# Patient Record
Sex: MALE | Race: WHITE | NOT HISPANIC OR LATINO | URBAN - METROPOLITAN AREA
[De-identification: names, ages, dates, MRNs, and addresses within clinical notes are randomized per-mention and may not be internally consistent; named-entity substitution may affect disease eponyms.]

---

## 2020-01-13 ENCOUNTER — OUTPATIENT (OUTPATIENT)
Dept: OUTPATIENT SERVICES | Facility: HOSPITAL | Age: 50
LOS: 1 days | End: 2020-01-13
Payer: COMMERCIAL

## 2020-01-13 VITALS
HEIGHT: 71.5 IN | HEART RATE: 66 BPM | OXYGEN SATURATION: 99 % | DIASTOLIC BLOOD PRESSURE: 90 MMHG | TEMPERATURE: 98 F | SYSTOLIC BLOOD PRESSURE: 132 MMHG | RESPIRATION RATE: 19 BRPM | WEIGHT: 242.95 LBS

## 2020-01-13 DIAGNOSIS — Z01.818 ENCOUNTER FOR OTHER PREPROCEDURAL EXAMINATION: ICD-10-CM

## 2020-01-13 DIAGNOSIS — Z12.11 ENCOUNTER FOR SCREENING FOR MALIGNANT NEOPLASM OF COLON: ICD-10-CM

## 2020-01-13 DIAGNOSIS — T14.8XXA OTHER INJURY OF UNSPECIFIED BODY REGION, INITIAL ENCOUNTER: Chronic | ICD-10-CM

## 2020-01-13 LAB
ANION GAP SERPL CALC-SCNC: 3 MMOL/L — LOW (ref 5–17)
BASOPHILS # BLD AUTO: 0.02 K/UL — SIGNIFICANT CHANGE UP (ref 0–0.2)
BASOPHILS NFR BLD AUTO: 0.3 % — SIGNIFICANT CHANGE UP (ref 0–2)
BUN SERPL-MCNC: 19 MG/DL — SIGNIFICANT CHANGE UP (ref 7–23)
CALCIUM SERPL-MCNC: 9.2 MG/DL — SIGNIFICANT CHANGE UP (ref 8.5–10.1)
CHLORIDE SERPL-SCNC: 110 MMOL/L — HIGH (ref 96–108)
CO2 SERPL-SCNC: 26 MMOL/L — SIGNIFICANT CHANGE UP (ref 22–31)
CREAT SERPL-MCNC: 1 MG/DL — SIGNIFICANT CHANGE UP (ref 0.5–1.3)
EOSINOPHIL # BLD AUTO: 0.11 K/UL — SIGNIFICANT CHANGE UP (ref 0–0.5)
EOSINOPHIL NFR BLD AUTO: 1.7 % — SIGNIFICANT CHANGE UP (ref 0–6)
GLUCOSE SERPL-MCNC: 187 MG/DL — HIGH (ref 70–99)
HCT VFR BLD CALC: 45.2 % — SIGNIFICANT CHANGE UP (ref 39–50)
HGB BLD-MCNC: 15.5 G/DL — SIGNIFICANT CHANGE UP (ref 13–17)
IMM GRANULOCYTES NFR BLD AUTO: 0.2 % — SIGNIFICANT CHANGE UP (ref 0–1.5)
LYMPHOCYTES # BLD AUTO: 2.29 K/UL — SIGNIFICANT CHANGE UP (ref 1–3.3)
LYMPHOCYTES # BLD AUTO: 35.9 % — SIGNIFICANT CHANGE UP (ref 13–44)
MCHC RBC-ENTMCNC: 30.4 PG — SIGNIFICANT CHANGE UP (ref 27–34)
MCHC RBC-ENTMCNC: 34.3 GM/DL — SIGNIFICANT CHANGE UP (ref 32–36)
MCV RBC AUTO: 88.6 FL — SIGNIFICANT CHANGE UP (ref 80–100)
MONOCYTES # BLD AUTO: 0.44 K/UL — SIGNIFICANT CHANGE UP (ref 0–0.9)
MONOCYTES NFR BLD AUTO: 6.9 % — SIGNIFICANT CHANGE UP (ref 2–14)
NEUTROPHILS # BLD AUTO: 3.5 K/UL — SIGNIFICANT CHANGE UP (ref 1.8–7.4)
NEUTROPHILS NFR BLD AUTO: 55 % — SIGNIFICANT CHANGE UP (ref 43–77)
PLATELET # BLD AUTO: 205 K/UL — SIGNIFICANT CHANGE UP (ref 150–400)
POTASSIUM SERPL-MCNC: 4.4 MMOL/L — SIGNIFICANT CHANGE UP (ref 3.5–5.3)
POTASSIUM SERPL-SCNC: 4.4 MMOL/L — SIGNIFICANT CHANGE UP (ref 3.5–5.3)
RBC # BLD: 5.1 M/UL — SIGNIFICANT CHANGE UP (ref 4.2–5.8)
RBC # FLD: 13.1 % — SIGNIFICANT CHANGE UP (ref 10.3–14.5)
SODIUM SERPL-SCNC: 139 MMOL/L — SIGNIFICANT CHANGE UP (ref 135–145)
WBC # BLD: 6.37 K/UL — SIGNIFICANT CHANGE UP (ref 3.8–10.5)
WBC # FLD AUTO: 6.37 K/UL — SIGNIFICANT CHANGE UP (ref 3.8–10.5)

## 2020-01-13 PROCEDURE — 93005 ELECTROCARDIOGRAM TRACING: CPT

## 2020-01-13 PROCEDURE — G0463: CPT | Mod: 25

## 2020-01-13 PROCEDURE — 80048 BASIC METABOLIC PNL TOTAL CA: CPT

## 2020-01-13 PROCEDURE — 85025 COMPLETE CBC W/AUTO DIFF WBC: CPT

## 2020-01-13 PROCEDURE — 36415 COLL VENOUS BLD VENIPUNCTURE: CPT

## 2020-01-13 PROCEDURE — 93010 ELECTROCARDIOGRAM REPORT: CPT

## 2020-01-13 NOTE — H&P PST ADULT - NSANTHOSAYNRD_GEN_A_CORE
No. LIVE screening performed.  STOP BANG Legend: 0-2 = LOW Risk; 3-4 = INTERMEDIATE Risk; 5-8 = HIGH Risk

## 2020-01-13 NOTE — H&P PST ADULT - NSICDXFAMILYHX_GEN_ALL_CORE_FT
FAMILY HISTORY:  Family history of bladder cancer, father - also CAD, pacemaker  Family history of uterine cancer, mother

## 2020-01-13 NOTE — H&P PST ADULT - ASSESSMENT
50 y/o male scheduled for routine screening colonoscopy.  Plan:  1. NPO post midnight  2. Follow bowel prep instructions from Dr. Buckley  3. Follow preop medication instructions from Dr. Buckley  4. Labs, EKG as per Dr. Buckley

## 2020-01-13 NOTE — H&P PST ADULT - TEACHING/LEARNING LEARNING PREFERENCES
individual instruction/pictorial/verbal instruction/written material/video/skill demonstration/group instruction

## 2020-01-13 NOTE — H&P PST ADULT - NSICDXPASTMEDICALHX_GEN_ALL_CORE_FT
PAST MEDICAL HISTORY:  DM (diabetes mellitus)     HTN (hypertension)     Hyperlipidemia     Obesity     Thyroid nodule

## 2020-01-14 DIAGNOSIS — Z01.818 ENCOUNTER FOR OTHER PREPROCEDURAL EXAMINATION: ICD-10-CM

## 2020-01-14 DIAGNOSIS — Z12.11 ENCOUNTER FOR SCREENING FOR MALIGNANT NEOPLASM OF COLON: ICD-10-CM

## 2020-01-21 ENCOUNTER — OUTPATIENT (OUTPATIENT)
Dept: OUTPATIENT SERVICES | Facility: HOSPITAL | Age: 50
LOS: 1 days | Discharge: HOME | End: 2020-01-21

## 2020-01-21 VITALS
WEIGHT: 235.01 LBS | TEMPERATURE: 97 F | OXYGEN SATURATION: 99 % | SYSTOLIC BLOOD PRESSURE: 134 MMHG | HEART RATE: 88 BPM | HEIGHT: 71.5 IN | DIASTOLIC BLOOD PRESSURE: 82 MMHG | RESPIRATION RATE: 21 BRPM

## 2020-01-21 DIAGNOSIS — Z12.11 ENCOUNTER FOR SCREENING FOR MALIGNANT NEOPLASM OF COLON: ICD-10-CM

## 2020-01-21 DIAGNOSIS — T14.8XXA OTHER INJURY OF UNSPECIFIED BODY REGION, INITIAL ENCOUNTER: Chronic | ICD-10-CM

## 2020-01-21 NOTE — ASU PATIENT PROFILE, ADULT - TEACHING/LEARNING LEARNING PREFERENCES
skill demonstration/group instruction/written material/video/pictorial/individual instruction/verbal instruction

## 2020-01-21 NOTE — ASU PREOP CHECKLIST - SITE MARKED BY ANESTHESIOLOGIST
Peer recovery support Xochitl Must in to speak with pt.       Benedicto Lucas RN  10/17/18 4749
Pt has already been assessed and needs in pt admission. Awaiting d/c's on 7s to proceed with admission. [facility] received a protection order, prohibiting pt to contact his wife and and child. [facility] police aware and faxed copy to [facility]. Pt is not permitted to use the phone at this time.             BECCA Moon  10/17/18 1232      PT WILL BE REFERRED OUT FOR IN  [place], [place]  10/17/18 7286
n/a

## 2020-01-23 DIAGNOSIS — Z79.4 LONG TERM (CURRENT) USE OF INSULIN: ICD-10-CM

## 2020-01-23 DIAGNOSIS — E11.9 TYPE 2 DIABETES MELLITUS WITHOUT COMPLICATIONS: ICD-10-CM

## 2020-01-23 DIAGNOSIS — E66.9 OBESITY, UNSPECIFIED: ICD-10-CM

## 2020-01-23 DIAGNOSIS — E78.5 HYPERLIPIDEMIA, UNSPECIFIED: ICD-10-CM

## 2020-01-23 DIAGNOSIS — Z12.11 ENCOUNTER FOR SCREENING FOR MALIGNANT NEOPLASM OF COLON: ICD-10-CM

## 2020-01-23 DIAGNOSIS — I10 ESSENTIAL (PRIMARY) HYPERTENSION: ICD-10-CM

## 2020-06-02 PROBLEM — Z00.00 ENCOUNTER FOR PREVENTIVE HEALTH EXAMINATION: Status: ACTIVE | Noted: 2020-06-02

## 2020-06-05 PROBLEM — E78.5 HYPERLIPIDEMIA, UNSPECIFIED: Chronic | Status: ACTIVE | Noted: 2020-01-13

## 2020-06-05 PROBLEM — E11.9 TYPE 2 DIABETES MELLITUS WITHOUT COMPLICATIONS: Chronic | Status: ACTIVE | Noted: 2020-01-13

## 2020-06-05 PROBLEM — E66.9 OBESITY, UNSPECIFIED: Chronic | Status: ACTIVE | Noted: 2020-01-13

## 2020-06-05 PROBLEM — E04.1 NONTOXIC SINGLE THYROID NODULE: Chronic | Status: ACTIVE | Noted: 2020-01-13

## 2020-06-05 PROBLEM — I10 ESSENTIAL (PRIMARY) HYPERTENSION: Chronic | Status: ACTIVE | Noted: 2020-01-13

## 2020-06-22 ENCOUNTER — APPOINTMENT (OUTPATIENT)
Dept: OTOLARYNGOLOGY | Facility: CLINIC | Age: 50
End: 2020-06-22
Payer: COMMERCIAL

## 2020-06-22 VITALS
TEMPERATURE: 97.2 F | DIASTOLIC BLOOD PRESSURE: 70 MMHG | BODY MASS INDEX: 32.2 KG/M2 | SYSTOLIC BLOOD PRESSURE: 130 MMHG | HEIGHT: 71 IN | WEIGHT: 230 LBS

## 2020-06-22 DIAGNOSIS — E04.2 NONTOXIC MULTINODULAR GOITER: ICD-10-CM

## 2020-06-22 DIAGNOSIS — R59.0 LOCALIZED ENLARGED LYMPH NODES: ICD-10-CM

## 2020-06-22 DIAGNOSIS — E11.9 TYPE 2 DIABETES MELLITUS W/OUT COMPLICATIONS: ICD-10-CM

## 2020-06-22 DIAGNOSIS — Z82.49 FAMILY HISTORY OF ISCHEMIC HEART DISEASE AND OTHER DISEASES OF THE CIRCULATORY SYSTEM: ICD-10-CM

## 2020-06-22 DIAGNOSIS — Z80.9 FAMILY HISTORY OF MALIGNANT NEOPLASM, UNSPECIFIED: ICD-10-CM

## 2020-06-22 DIAGNOSIS — Z78.9 OTHER SPECIFIED HEALTH STATUS: ICD-10-CM

## 2020-06-22 DIAGNOSIS — Z82.2 FAMILY HISTORY OF DEAFNESS AND HEARING LOSS: ICD-10-CM

## 2020-06-22 DIAGNOSIS — Z80.52 FAMILY HISTORY OF MALIGNANT NEOPLASM OF BLADDER: ICD-10-CM

## 2020-06-22 PROCEDURE — 99204 OFFICE O/P NEW MOD 45 MIN: CPT

## 2020-06-22 RX ORDER — GLIMEPIRIDE 4 MG/1
TABLET ORAL
Refills: 0 | Status: ACTIVE | COMMUNITY

## 2020-06-22 RX ORDER — SIMVASTATIN 40 MG/1
40 TABLET, FILM COATED ORAL
Refills: 0 | Status: ACTIVE | COMMUNITY

## 2020-06-22 RX ORDER — EMPAGLIFLOZIN 10 MG/1
10 TABLET, FILM COATED ORAL
Refills: 0 | Status: ACTIVE | COMMUNITY

## 2020-06-22 RX ORDER — PIOGLITAZONE HYDROCHLORIDE 15 MG/1
15 TABLET ORAL
Refills: 0 | Status: ACTIVE | COMMUNITY

## 2020-06-22 RX ORDER — METFORMIN HYDROCHLORIDE 625 MG/1
TABLET ORAL
Refills: 0 | Status: ACTIVE | COMMUNITY

## 2020-06-22 NOTE — CONSULT LETTER
[Dear  ___] : Dear  [unfilled], [Consult Letter:] : I had the pleasure of evaluating your patient, [unfilled]. [Please see my note below.] : Please see my note below. [Consult Closing:] : Thank you very much for allowing me to participate in the care of this patient.  If you have any questions, please do not hesitate to contact me. [Sincerely,] : Sincerely, [FreeTextEntry2] : Alexx Suazo MD [FreeTextEntry3] : Alexx Simons MD, FACS\par Chief of Otolaryngology [facility]\par  - Dept. of Otolaryngology\par [facility] [employer]\par \par

## 2020-06-22 NOTE — PHYSICAL EXAM
[de-identified] : Palpable L thyroid nodule.  Neck node not palpable.   [Midline] : trachea located in midline position [Normal] : no rashes

## 2020-06-22 NOTE — HISTORY OF PRESENT ILLNESS
[de-identified] : 50 y/o M, referred by Dr. Suazo, with a h/o thyroid nodules and a newly identified  hypervascular neck node.  He is asymptomatic.

## 2020-08-24 ENCOUNTER — APPOINTMENT (OUTPATIENT)
Dept: ULTRASOUND IMAGING | Facility: CLINIC | Age: 50
End: 2020-08-24
Payer: COMMERCIAL

## 2020-08-24 ENCOUNTER — OUTPATIENT (OUTPATIENT)
Dept: OUTPATIENT SERVICES | Facility: HOSPITAL | Age: 50
LOS: 1 days | End: 2020-08-24
Payer: COMMERCIAL

## 2020-08-24 ENCOUNTER — RESULT REVIEW (OUTPATIENT)
Age: 50
End: 2020-08-24

## 2020-08-24 DIAGNOSIS — R59.0 LOCALIZED ENLARGED LYMPH NODES: ICD-10-CM

## 2020-08-24 DIAGNOSIS — T14.8XXA OTHER INJURY OF UNSPECIFIED BODY REGION, INITIAL ENCOUNTER: Chronic | ICD-10-CM

## 2020-08-24 PROCEDURE — 88305 TISSUE EXAM BY PATHOLOGIST: CPT

## 2020-08-24 PROCEDURE — 88173 CYTOPATH EVAL FNA REPORT: CPT | Mod: 26

## 2020-08-24 PROCEDURE — 88305 TISSUE EXAM BY PATHOLOGIST: CPT | Mod: 26

## 2020-08-24 PROCEDURE — 10005 FNA BX W/US GDN 1ST LES: CPT

## 2020-08-24 PROCEDURE — 88173 CYTOPATH EVAL FNA REPORT: CPT

## 2020-08-26 LAB — NON-GYNECOLOGICAL CYTOLOGY STUDY: SIGNIFICANT CHANGE UP

## 2020-09-25 ENCOUNTER — APPOINTMENT (OUTPATIENT)
Dept: MRI IMAGING | Facility: CLINIC | Age: 50
End: 2020-09-25
Payer: SELF-PAY

## 2020-09-25 ENCOUNTER — OUTPATIENT (OUTPATIENT)
Dept: OUTPATIENT SERVICES | Facility: HOSPITAL | Age: 50
LOS: 1 days | End: 2020-09-25
Payer: SELF-PAY

## 2020-09-25 DIAGNOSIS — C73 MALIGNANT NEOPLASM OF THYROID GLAND: ICD-10-CM

## 2020-09-25 DIAGNOSIS — T14.8XXA OTHER INJURY OF UNSPECIFIED BODY REGION, INITIAL ENCOUNTER: Chronic | ICD-10-CM

## 2020-09-25 DIAGNOSIS — R59.0 LOCALIZED ENLARGED LYMPH NODES: ICD-10-CM

## 2020-09-25 PROCEDURE — 70543 MRI ORBT/FAC/NCK W/O &W/DYE: CPT | Mod: 26

## 2020-09-25 PROCEDURE — 70543 MRI ORBT/FAC/NCK W/O &W/DYE: CPT

## 2020-10-23 ENCOUNTER — NON-APPOINTMENT (OUTPATIENT)
Age: 50
End: 2020-10-23

## 2021-01-19 ENCOUNTER — APPOINTMENT (OUTPATIENT)
Dept: OTOLARYNGOLOGY | Facility: CLINIC | Age: 51
End: 2021-01-19

## 2021-03-23 ENCOUNTER — APPOINTMENT (OUTPATIENT)
Dept: OTOLARYNGOLOGY | Facility: CLINIC | Age: 51
End: 2021-03-23

## 2022-04-26 ENCOUNTER — APPOINTMENT (OUTPATIENT)
Dept: OTOLARYNGOLOGY | Facility: CLINIC | Age: 52
End: 2022-04-26
Payer: COMMERCIAL

## 2022-04-26 VITALS
HEART RATE: 94 BPM | DIASTOLIC BLOOD PRESSURE: 83 MMHG | HEIGHT: 72 IN | BODY MASS INDEX: 27.77 KG/M2 | WEIGHT: 205 LBS | OXYGEN SATURATION: 96 % | SYSTOLIC BLOOD PRESSURE: 136 MMHG

## 2022-04-26 DIAGNOSIS — C77.0 SECONDARY AND UNSPECIFIED MALIGNANT NEOPLASM OF LYMPH NODES OF HEAD, FACE AND NECK: ICD-10-CM

## 2022-04-26 PROCEDURE — 99244 OFF/OP CNSLTJ NEW/EST MOD 40: CPT

## 2022-04-26 NOTE — CONSULT LETTER
[Dear  ___] : Dear  [unfilled], [Consult Letter:] : I had the pleasure of evaluating your patient, [unfilled]. [Please see my note below.] : Please see my note below. [Consult Closing:] : Thank you very much for allowing me to participate in the care of this patient.  If you have any questions, please do not hesitate to contact me. [Sincerely,] : Sincerely, [FreeTextEntry2] : Alexx Suazo MD ([place], [place]) [FreeTextEntry3] : Joe Billy MD, FACS\par \par    [facility] [place]\par Associate Chair\par    Department of Otolaryngology\par \par Professor\par Otolaryngology & Molecular Medicine\par [facility] [employer]\par

## 2022-04-26 NOTE — HISTORY OF PRESENT ILLNESS
[None] : No associated symptoms are reported. [de-identified] : Mr. Vigil is a 50 yo male who presents for evaluation of  suspicious thyroid gland nodule and lymph node. He is being referred by Dr. Suazo. Lymph node previously biopsied and he verbalizes not having surgery due to insurance was not in effect. He recently saw [place] [facility]  Dr. Bhatti who ordered a PET scan. \par Around Fall of 2021,he also reports having a thyroid nodule FNA at Dr. Suazo's office and was told to be benign-L side According to Dr. Suazo's notes on 4/2021 FNA on  left thyroid lobe  nodule with benign findings. 3/24/2022 sonogram left lobe thyroid 3.5 cm nodule previously measured 2.9 cm.\par 8/24/2020 FNA right level III cervical lymph node showing PTCA at NW\par 3/10/2022 TFTs wnr.  On Synthroid  Thyroglobulin >300\par 1/10/2022 PET scan showing FDG avid at the left thyroid lobe with large hypodense area. \par 10/2/2020 MRI 2.7 cm bi-lobulated right level III lymph node. Numerous other cervical lymph nodes identified, largest in right level II measuring up to 1.3cm. Dominant left thyroid nodule measuring up to 2.1 cm. Possible additional right thyroid nodule measuring up to 0.7 cm\par Denies pain, dysphagia, dysphonia and or dyspnea \par Some weight loss recently, but he has had different lifestyle with working at night. \par Denies history oif smoking. Rare alcohol intake.\par No cardiac or pulmonary issues. \par Received COvid 19 vacc booster\par \par no swaloow or resp issues. \par \par The nodes in R neck were picked up incidentally on eval in office w Dr Suazo who pt sees for DM.

## 2022-05-12 ENCOUNTER — RESULT REVIEW (OUTPATIENT)
Age: 52
End: 2022-05-12

## 2022-05-18 DIAGNOSIS — Z01.818 ENCOUNTER FOR OTHER PREPROCEDURAL EXAMINATION: ICD-10-CM

## 2022-05-24 ENCOUNTER — OUTPATIENT (OUTPATIENT)
Dept: OUTPATIENT SERVICES | Facility: HOSPITAL | Age: 52
LOS: 1 days | End: 2022-05-24
Payer: COMMERCIAL

## 2022-05-24 ENCOUNTER — APPOINTMENT (OUTPATIENT)
Dept: ULTRASOUND IMAGING | Facility: CLINIC | Age: 52
End: 2022-05-24
Payer: COMMERCIAL

## 2022-05-24 ENCOUNTER — APPOINTMENT (OUTPATIENT)
Dept: MRI IMAGING | Facility: CLINIC | Age: 52
End: 2022-05-24
Payer: COMMERCIAL

## 2022-05-24 DIAGNOSIS — T14.8XXA OTHER INJURY OF UNSPECIFIED BODY REGION, INITIAL ENCOUNTER: Chronic | ICD-10-CM

## 2022-05-24 DIAGNOSIS — C77.0 SECONDARY AND UNSPECIFIED MALIGNANT NEOPLASM OF LYMPH NODES OF HEAD, FACE AND NECK: ICD-10-CM

## 2022-05-24 PROCEDURE — 70543 MRI ORBT/FAC/NCK W/O &W/DYE: CPT

## 2022-05-24 PROCEDURE — 70543 MRI ORBT/FAC/NCK W/O &W/DYE: CPT | Mod: 26

## 2022-05-24 PROCEDURE — 76536 US EXAM OF HEAD AND NECK: CPT | Mod: 26

## 2022-05-24 PROCEDURE — 76536 US EXAM OF HEAD AND NECK: CPT

## 2022-05-24 PROCEDURE — A9585: CPT

## 2022-06-13 NOTE — H&P PST ADULT - ADMIT DATE
13-Jan-2020 Please notify patient, EGD bx neg for HP. Would repeat EGD in 2 years for gastric mapping as some abnormal, but benign cells were seen. Colon polyps were benign, repeat in 2 years. Please go over prep instructions closely with patient - ate solid food the entire day prior, please remind him to go on CLD entire day before.

## 2022-06-14 ENCOUNTER — APPOINTMENT (OUTPATIENT)
Dept: OTOLARYNGOLOGY | Facility: CLINIC | Age: 52
End: 2022-06-14
Payer: COMMERCIAL

## 2022-06-14 VITALS
DIASTOLIC BLOOD PRESSURE: 87 MMHG | HEART RATE: 90 BPM | HEIGHT: 72 IN | WEIGHT: 205 LBS | BODY MASS INDEX: 27.77 KG/M2 | OXYGEN SATURATION: 97 % | SYSTOLIC BLOOD PRESSURE: 139 MMHG

## 2022-06-14 PROCEDURE — 99214 OFFICE O/P EST MOD 30 MIN: CPT

## 2022-06-14 NOTE — CONSULT LETTER
[Dear  ___] : Dear  [unfilled], [Courtesy Letter:] : I had the pleasure of seeing your patient, [unfilled], in my office today. [Please see my note below.] : Please see my note below. [Sincerely,] : Sincerely, [FreeTextEntry2] : Alexx Suazo MD ([place], [place]) \par  [FreeTextEntry3] : Joe Billy MD, FACS\par \par    [facility] [place]\par Associate Chair\par    Department of Otolaryngology\par \par Professor\par Otolaryngology & Molecular Medicine\par [facility] [employer]\par

## 2022-06-14 NOTE — HISTORY OF PRESENT ILLNESS
[de-identified] : Mr. Vigil presents for surgical discussion on PTCA. \par 5/24/2022 MRI done and sonogram again noted  abnormal appearing right cervical level 3 lymph nodes measuring 1.5 \par 3/10/2022 TFTs wnr, on Synthroid ?

## 2022-08-08 ENCOUNTER — OUTPATIENT (OUTPATIENT)
Dept: OUTPATIENT SERVICES | Facility: HOSPITAL | Age: 52
LOS: 1 days | End: 2022-08-08
Payer: COMMERCIAL

## 2022-08-08 VITALS
OXYGEN SATURATION: 97 % | SYSTOLIC BLOOD PRESSURE: 135 MMHG | HEART RATE: 76 BPM | HEIGHT: 70 IN | DIASTOLIC BLOOD PRESSURE: 77 MMHG | TEMPERATURE: 97 F | WEIGHT: 218.26 LBS | RESPIRATION RATE: 16 BRPM

## 2022-08-08 DIAGNOSIS — E78.5 HYPERLIPIDEMIA, UNSPECIFIED: ICD-10-CM

## 2022-08-08 DIAGNOSIS — E11.9 TYPE 2 DIABETES MELLITUS WITHOUT COMPLICATIONS: ICD-10-CM

## 2022-08-08 DIAGNOSIS — Z01.818 ENCOUNTER FOR OTHER PREPROCEDURAL EXAMINATION: ICD-10-CM

## 2022-08-08 DIAGNOSIS — I10 ESSENTIAL (PRIMARY) HYPERTENSION: ICD-10-CM

## 2022-08-08 DIAGNOSIS — T14.8XXA OTHER INJURY OF UNSPECIFIED BODY REGION, INITIAL ENCOUNTER: Chronic | ICD-10-CM

## 2022-08-08 DIAGNOSIS — C77.9 SECONDARY AND UNSPECIFIED MALIGNANT NEOPLASM OF LYMPH NODE, UNSPECIFIED: ICD-10-CM

## 2022-08-08 DIAGNOSIS — C77.0 SECONDARY AND UNSPECIFIED MALIGNANT NEOPLASM OF LYMPH NODES OF HEAD, FACE AND NECK: ICD-10-CM

## 2022-08-08 DIAGNOSIS — E03.9 HYPOTHYROIDISM, UNSPECIFIED: ICD-10-CM

## 2022-08-08 LAB
ANION GAP SERPL CALC-SCNC: 9 MMOL/L — SIGNIFICANT CHANGE UP (ref 5–17)
BLD GP AB SCN SERPL QL: SIGNIFICANT CHANGE UP
BUN SERPL-MCNC: 28 MG/DL — HIGH (ref 7–23)
CALCIUM SERPL-MCNC: 9.1 MG/DL — SIGNIFICANT CHANGE UP (ref 8.4–10.5)
CHLORIDE SERPL-SCNC: 105 MMOL/L — SIGNIFICANT CHANGE UP (ref 96–108)
CO2 SERPL-SCNC: 22 MMOL/L — SIGNIFICANT CHANGE UP (ref 22–31)
CREAT SERPL-MCNC: 0.89 MG/DL — SIGNIFICANT CHANGE UP (ref 0.5–1.3)
EGFR: 102 ML/MIN/1.73M2 — SIGNIFICANT CHANGE UP
GLUCOSE SERPL-MCNC: 134 MG/DL — HIGH (ref 70–99)
HCT VFR BLD CALC: 46.1 % — SIGNIFICANT CHANGE UP (ref 39–50)
HGB BLD-MCNC: 15.9 G/DL — SIGNIFICANT CHANGE UP (ref 13–17)
MCHC RBC-ENTMCNC: 29.9 PG — SIGNIFICANT CHANGE UP (ref 27–34)
MCHC RBC-ENTMCNC: 34.5 GM/DL — SIGNIFICANT CHANGE UP (ref 32–36)
MCV RBC AUTO: 86.8 FL — SIGNIFICANT CHANGE UP (ref 80–100)
NRBC # BLD: 0 /100 WBCS — SIGNIFICANT CHANGE UP (ref 0–0)
PLATELET # BLD AUTO: 216 K/UL — SIGNIFICANT CHANGE UP (ref 150–400)
POTASSIUM SERPL-MCNC: 3.9 MMOL/L — SIGNIFICANT CHANGE UP (ref 3.5–5.3)
POTASSIUM SERPL-SCNC: 3.9 MMOL/L — SIGNIFICANT CHANGE UP (ref 3.5–5.3)
RBC # BLD: 5.31 M/UL — SIGNIFICANT CHANGE UP (ref 4.2–5.8)
RBC # FLD: 12.9 % — SIGNIFICANT CHANGE UP (ref 10.3–14.5)
SODIUM SERPL-SCNC: 136 MMOL/L — SIGNIFICANT CHANGE UP (ref 135–145)
WBC # BLD: 7.37 K/UL — SIGNIFICANT CHANGE UP (ref 3.8–10.5)
WBC # FLD AUTO: 7.37 K/UL — SIGNIFICANT CHANGE UP (ref 3.8–10.5)

## 2022-08-08 PROCEDURE — 86901 BLOOD TYPING SEROLOGIC RH(D): CPT

## 2022-08-08 PROCEDURE — 86900 BLOOD TYPING SEROLOGIC ABO: CPT

## 2022-08-08 PROCEDURE — G0463: CPT

## 2022-08-08 PROCEDURE — 93010 ELECTROCARDIOGRAM REPORT: CPT | Mod: NC

## 2022-08-08 PROCEDURE — 36415 COLL VENOUS BLD VENIPUNCTURE: CPT

## 2022-08-08 PROCEDURE — 86850 RBC ANTIBODY SCREEN: CPT

## 2022-08-08 PROCEDURE — 85027 COMPLETE CBC AUTOMATED: CPT

## 2022-08-08 PROCEDURE — 93005 ELECTROCARDIOGRAM TRACING: CPT

## 2022-08-08 PROCEDURE — 80048 BASIC METABOLIC PNL TOTAL CA: CPT

## 2022-08-08 RX ORDER — PIOGLITAZONE HYDROCHLORIDE 15 MG/1
1 TABLET ORAL
Qty: 0 | Refills: 0 | DISCHARGE

## 2022-08-08 RX ORDER — DULAGLUTIDE 4.5 MG/.5ML
0 INJECTION, SOLUTION SUBCUTANEOUS
Qty: 0 | Refills: 0 | DISCHARGE

## 2022-08-08 RX ORDER — SIMVASTATIN 20 MG/1
1 TABLET, FILM COATED ORAL
Qty: 0 | Refills: 0 | DISCHARGE

## 2022-08-08 NOTE — H&P PST ADULT - HISTORY OF PRESENT ILLNESS
53yo male with medical h/o T2DM, HTN, Hypothyroid, and HDL, reports Thyroid cancer and presents today for PST for scheduled Left Thyroidectomy w/Limited Neck Dissection, Right Modified Radical Neck Dissection on 8/22/22

## 2022-08-08 NOTE — H&P PST ADULT - PROBLEM SELECTOR PLAN 1
Pre-op instructions given. Pt verbalized understanding  Chlorhexidine wash instructions given  ABO + Accu-Chek ordered STAT for day of procedure  Pt to HOLD all diabetes oral meds day of procedure - hold Jardiance 3 days before surgery   Pt instructed to HOLD all NSAID, Herbal tea/supplements  Pending: M/C    Pending: Covid pcr test/results

## 2022-08-08 NOTE — H&P PST ADULT - MUSCULOSKELETAL
negative ROM intact/normal gait/strength 5/5 bilateral upper extremities/strength 5/5 bilateral lower extremities

## 2022-08-08 NOTE — H&P PST ADULT - FALL HARM RISK - UNIVERSAL INTERVENTIONS
Bed in lowest position, wheels locked, appropriate side rails in place/Call bell, personal items and telephone in reach/Instruct patient to call for assistance before getting out of bed or chair/Non-slip footwear when patient is out of bed/[place] to call system/Physically safe environment - no spills, clutter or unnecessary equipment/Purposeful Proactive Rounding/Room/bathroom lighting operational, light cord in reach

## 2022-08-08 NOTE — H&P PST ADULT - BRAND OF COVID-19 VACCINATION
Spoke with mom about the results. Will order an upper GI. Please call mom to schedule test.    Moderna dose 1 and 2

## 2022-08-08 NOTE — H&P PST ADULT - PROBLEM SELECTOR PLAN 4
Pt instructed to take meds as prescribed  Accu-Chek ordered STAT for day of procedure  Pt to HOLD all diabetes oral meds day of procedure - hold Jardiance 3 days before surgery

## 2022-08-08 NOTE — H&P PST ADULT - NSANTHOSAYNRD_GEN_A_CORE
neck 17inches/No. LIVE screening performed.  STOP BANG Legend: 0-2 = LOW Risk; 3-4 = INTERMEDIATE Risk; 5-8 = HIGH Risk

## 2022-08-19 LAB — SURGICAL PATHOLOGY STUDY: SIGNIFICANT CHANGE UP

## 2022-08-21 ENCOUNTER — TRANSCRIPTION ENCOUNTER (OUTPATIENT)
Age: 52
End: 2022-08-21

## 2022-08-22 ENCOUNTER — INPATIENT (INPATIENT)
Facility: HOSPITAL | Age: 52
LOS: 0 days | Discharge: ANOTHER HOSPITAL | DRG: 626 | End: 2022-08-22
Attending: OTOLARYNGOLOGY | Admitting: OTOLARYNGOLOGY
Payer: COMMERCIAL

## 2022-08-22 ENCOUNTER — RESULT REVIEW (OUTPATIENT)
Age: 52
End: 2022-08-22

## 2022-08-22 ENCOUNTER — TRANSCRIPTION ENCOUNTER (OUTPATIENT)
Age: 52
End: 2022-08-22

## 2022-08-22 ENCOUNTER — APPOINTMENT (OUTPATIENT)
Dept: OTOLARYNGOLOGY | Facility: HOSPITAL | Age: 52
End: 2022-08-22

## 2022-08-22 ENCOUNTER — INPATIENT (INPATIENT)
Facility: HOSPITAL | Age: 52
LOS: 4 days | Discharge: HOME | End: 2022-08-27
Attending: OTOLARYNGOLOGY | Admitting: OTOLARYNGOLOGY

## 2022-08-22 VITALS
OXYGEN SATURATION: 98 % | HEIGHT: 70 IN | WEIGHT: 218.26 LBS | SYSTOLIC BLOOD PRESSURE: 128 MMHG | DIASTOLIC BLOOD PRESSURE: 78 MMHG | RESPIRATION RATE: 14 BRPM | TEMPERATURE: 98 F | HEART RATE: 75 BPM

## 2022-08-22 VITALS
TEMPERATURE: 98 F | RESPIRATION RATE: 17 BRPM | SYSTOLIC BLOOD PRESSURE: 117 MMHG | OXYGEN SATURATION: 99 % | HEART RATE: 87 BPM | DIASTOLIC BLOOD PRESSURE: 76 MMHG

## 2022-08-22 VITALS — HEIGHT: 70 IN | WEIGHT: 218.26 LBS

## 2022-08-22 DIAGNOSIS — T14.8XXA OTHER INJURY OF UNSPECIFIED BODY REGION, INITIAL ENCOUNTER: Chronic | ICD-10-CM

## 2022-08-22 DIAGNOSIS — C77.0 SECONDARY AND UNSPECIFIED MALIGNANT NEOPLASM OF LYMPH NODES OF HEAD, FACE AND NECK: ICD-10-CM

## 2022-08-22 DIAGNOSIS — C80.1 MALIGNANT (PRIMARY) NEOPLASM, UNSPECIFIED: ICD-10-CM

## 2022-08-22 LAB
A1C WITH ESTIMATED AVERAGE GLUCOSE RESULT: 7.6 % — HIGH (ref 4–5.6)
ABO RH CONFIRMATION: SIGNIFICANT CHANGE UP
ANION GAP SERPL CALC-SCNC: 12 MMOL/L — SIGNIFICANT CHANGE UP (ref 7–14)
APTT BLD: 26.3 SEC — LOW (ref 27–36.3)
BASE EXCESS BLDA CALC-SCNC: -4.8 MMOL/L — LOW (ref -2–3)
BLD GP AB SCN SERPL QL: NEGATIVE — SIGNIFICANT CHANGE UP
BLOOD GAS ARTERIAL - LYTES,HGB,ICA,LACT RESULT: SIGNIFICANT CHANGE UP
BUN SERPL-MCNC: 22 MG/DL — SIGNIFICANT CHANGE UP (ref 7–23)
CALCIUM SERPL-MCNC: 8.3 MG/DL — LOW (ref 8.4–10.5)
CHLORIDE SERPL-SCNC: 105 MMOL/L — SIGNIFICANT CHANGE UP (ref 98–107)
CO2 BLDA-SCNC: 22 MMOL/L — SIGNIFICANT CHANGE UP (ref 19–24)
CO2 SERPL-SCNC: 18 MMOL/L — LOW (ref 22–31)
CREAT SERPL-MCNC: 0.99 MG/DL — SIGNIFICANT CHANGE UP (ref 0.5–1.3)
EGFR: 91 ML/MIN/1.73M2 — SIGNIFICANT CHANGE UP
ESTIMATED AVERAGE GLUCOSE: 171 — SIGNIFICANT CHANGE UP
GAS PNL BLDA: SIGNIFICANT CHANGE UP
GLUCOSE BLDC GLUCOMTR-MCNC: 183 MG/DL — HIGH (ref 70–99)
GLUCOSE BLDC GLUCOMTR-MCNC: 209 MG/DL — HIGH (ref 70–99)
GLUCOSE BLDC GLUCOMTR-MCNC: 273 MG/DL — HIGH (ref 70–99)
GLUCOSE SERPL-MCNC: 258 MG/DL — HIGH (ref 70–99)
HCO3 BLDA-SCNC: 21 MMOL/L — SIGNIFICANT CHANGE UP (ref 21–28)
HCT VFR BLD CALC: 40.7 % — SIGNIFICANT CHANGE UP (ref 39–50)
HGB BLD-MCNC: 14.1 G/DL — SIGNIFICANT CHANGE UP (ref 13–17)
HOROWITZ INDEX BLDA+IHG-RTO: 60 — SIGNIFICANT CHANGE UP
INR BLD: 1.04 RATIO — SIGNIFICANT CHANGE UP (ref 0.88–1.16)
MAGNESIUM SERPL-MCNC: 1.7 MG/DL — SIGNIFICANT CHANGE UP (ref 1.6–2.6)
MCHC RBC-ENTMCNC: 30.8 PG — SIGNIFICANT CHANGE UP (ref 27–34)
MCHC RBC-ENTMCNC: 34.6 GM/DL — SIGNIFICANT CHANGE UP (ref 32–36)
MCV RBC AUTO: 88.9 FL — SIGNIFICANT CHANGE UP (ref 80–100)
NRBC # BLD: 0 /100 WBCS — SIGNIFICANT CHANGE UP (ref 0–0)
NRBC # FLD: 0 K/UL — SIGNIFICANT CHANGE UP (ref 0–0)
PCO2 BLDA: 39 MMHG — SIGNIFICANT CHANGE UP (ref 35–48)
PH BLDA: 7.34 — LOW (ref 7.35–7.45)
PHOSPHATE SERPL-MCNC: 4.1 MG/DL — SIGNIFICANT CHANGE UP (ref 2.5–4.5)
PLATELET # BLD AUTO: 209 K/UL — SIGNIFICANT CHANGE UP (ref 150–400)
PO2 BLDA: 92 MMHG — SIGNIFICANT CHANGE UP (ref 83–108)
POTASSIUM SERPL-MCNC: 4.2 MMOL/L — SIGNIFICANT CHANGE UP (ref 3.5–5.3)
POTASSIUM SERPL-SCNC: 4.2 MMOL/L — SIGNIFICANT CHANGE UP (ref 3.5–5.3)
PROTHROM AB SERPL-ACNC: 12.1 SEC — SIGNIFICANT CHANGE UP (ref 10.5–13.4)
PTH INTACT, INTRAOP SPECIMEN 2: SIGNIFICANT CHANGE UP
PTH INTACT, INTRAOP TIMING 2: SIGNIFICANT CHANGE UP
PTH INTACT, INTRAOPERATIVE 2: 28 PG/ML — SIGNIFICANT CHANGE UP (ref 15–65)
PTH-INTACT IO % DIF SERPL: 44 PG/ML — SIGNIFICANT CHANGE UP (ref 15–65)
RBC # BLD: 4.58 M/UL — SIGNIFICANT CHANGE UP (ref 4.2–5.8)
RBC # FLD: 13.3 % — SIGNIFICANT CHANGE UP (ref 10.3–14.5)
RH IG SCN BLD-IMP: POSITIVE — SIGNIFICANT CHANGE UP
SAO2 % BLDA: 98.2 % — HIGH (ref 94–98)
SARS-COV-2 RNA SPEC QL NAA+PROBE: SIGNIFICANT CHANGE UP
SODIUM SERPL-SCNC: 135 MMOL/L — SIGNIFICANT CHANGE UP (ref 135–145)
WBC # BLD: 13.61 K/UL — HIGH (ref 3.8–10.5)
WBC # FLD AUTO: 13.61 K/UL — HIGH (ref 3.8–10.5)

## 2022-08-22 PROCEDURE — 94002 VENT MGMT INPAT INIT DAY: CPT

## 2022-08-22 PROCEDURE — 60254 EXTENSIVE THYROID SURGERY: CPT | Mod: AS

## 2022-08-22 PROCEDURE — 99254 IP/OBS CNSLTJ NEW/EST MOD 60: CPT | Mod: 25

## 2022-08-22 PROCEDURE — 36415 COLL VENOUS BLD VENIPUNCTURE: CPT

## 2022-08-22 PROCEDURE — 71045 X-RAY EXAM CHEST 1 VIEW: CPT | Mod: 26,77

## 2022-08-22 PROCEDURE — 36600 WITHDRAWAL OF ARTERIAL BLOOD: CPT

## 2022-08-22 PROCEDURE — 38724 REMOVAL OF LYMPH NODES NECK: CPT | Mod: RT

## 2022-08-22 PROCEDURE — 71045 X-RAY EXAM CHEST 1 VIEW: CPT

## 2022-08-22 PROCEDURE — 82962 GLUCOSE BLOOD TEST: CPT

## 2022-08-22 PROCEDURE — 82803 BLOOD GASES ANY COMBINATION: CPT

## 2022-08-22 PROCEDURE — C1889: CPT

## 2022-08-22 PROCEDURE — 87635 SARS-COV-2 COVID-19 AMP PRB: CPT

## 2022-08-22 PROCEDURE — 83970 ASSAY OF PARATHORMONE: CPT

## 2022-08-22 PROCEDURE — 60252 REMOVAL OF THYROID: CPT | Mod: LT

## 2022-08-22 PROCEDURE — 71045 X-RAY EXAM CHEST 1 VIEW: CPT | Mod: 26

## 2022-08-22 PROCEDURE — L8699: CPT

## 2022-08-22 PROCEDURE — 88307 TISSUE EXAM BY PATHOLOGIST: CPT

## 2022-08-22 PROCEDURE — 88307 TISSUE EXAM BY PATHOLOGIST: CPT | Mod: 26

## 2022-08-22 DEVICE — CLIP 24 SMALL TITAN: Type: IMPLANTABLE DEVICE | Site: LEFT | Status: FUNCTIONAL

## 2022-08-22 DEVICE — CARTRIDGE MICROCLIP 30: Type: IMPLANTABLE DEVICE | Site: LEFT | Status: FUNCTIONAL

## 2022-08-22 DEVICE — ET TUBE SZ 7 NIM TRIVANTAGE EMG: Type: IMPLANTABLE DEVICE | Site: LEFT | Status: FUNCTIONAL

## 2022-08-22 DEVICE — SPONGE HSTAT SURGICEL 2X14": Type: IMPLANTABLE DEVICE | Site: LEFT | Status: FUNCTIONAL

## 2022-08-22 DEVICE — HEMOCLIP MED BLUE 6 CARTRIDGE TITANIUM: Type: IMPLANTABLE DEVICE | Site: LEFT | Status: FUNCTIONAL

## 2022-08-22 DEVICE — TUBE EMG NIM ENDO TRIVANTAGE 8MM: Type: IMPLANTABLE DEVICE | Site: LEFT | Status: FUNCTIONAL

## 2022-08-22 DEVICE — TUBE TRACH SZ 6 CUFF FLEX REUSE: Type: IMPLANTABLE DEVICE | Site: LEFT | Status: FUNCTIONAL

## 2022-08-22 DEVICE — IMPLANTABLE DEVICE: Type: IMPLANTABLE DEVICE | Site: LEFT | Status: FUNCTIONAL

## 2022-08-22 RX ORDER — PROPOFOL 10 MG/ML
50 INJECTION, EMULSION INTRAVENOUS
Qty: 1000 | Refills: 0 | Status: DISCONTINUED | OUTPATIENT
Start: 2022-08-22 | End: 2022-08-22

## 2022-08-22 RX ORDER — SODIUM CHLORIDE 9 MG/ML
1000 INJECTION, SOLUTION INTRAVENOUS
Refills: 0 | Status: DISCONTINUED | OUTPATIENT
Start: 2022-08-22 | End: 2022-08-24

## 2022-08-22 RX ORDER — INSULIN LISPRO 100/ML
VIAL (ML) SUBCUTANEOUS EVERY 6 HOURS
Refills: 0 | Status: DISCONTINUED | OUTPATIENT
Start: 2022-08-22 | End: 2022-08-22

## 2022-08-22 RX ORDER — DEXAMETHASONE 0.5 MG/5ML
10 ELIXIR ORAL EVERY 6 HOURS
Refills: 0 | Status: DISCONTINUED | OUTPATIENT
Start: 2022-08-22 | End: 2022-08-24

## 2022-08-22 RX ORDER — METFORMIN HYDROCHLORIDE 850 MG/1
3 TABLET ORAL
Qty: 0 | Refills: 0 | DISCHARGE

## 2022-08-22 RX ORDER — MAGNESIUM SULFATE 500 MG/ML
2 VIAL (ML) INJECTION ONCE
Refills: 0 | Status: COMPLETED | OUTPATIENT
Start: 2022-08-22 | End: 2022-08-22

## 2022-08-22 RX ORDER — DEXTROSE 50 % IN WATER 50 %
25 SYRINGE (ML) INTRAVENOUS ONCE
Refills: 0 | Status: DISCONTINUED | OUTPATIENT
Start: 2022-08-22 | End: 2022-08-22

## 2022-08-22 RX ORDER — PROPOFOL 10 MG/ML
0 INJECTION, EMULSION INTRAVENOUS
Qty: 0 | Refills: 0 | DISCHARGE
Start: 2022-08-22

## 2022-08-22 RX ORDER — DEXAMETHASONE 0.5 MG/5ML
10 ELIXIR ORAL
Qty: 1 | Refills: 0
Start: 2022-08-22

## 2022-08-22 RX ORDER — DEXAMETHASONE 0.5 MG/5ML
10 ELIXIR ORAL EVERY 6 HOURS
Refills: 0 | Status: DISCONTINUED | OUTPATIENT
Start: 2022-08-22 | End: 2022-08-22

## 2022-08-22 RX ORDER — LEVOTHYROXINE SODIUM 125 MCG
1 TABLET ORAL
Qty: 0 | Refills: 0 | DISCHARGE

## 2022-08-22 RX ORDER — SODIUM CHLORIDE 9 MG/ML
1000 INJECTION, SOLUTION INTRAVENOUS
Refills: 0 | Status: DISCONTINUED | OUTPATIENT
Start: 2022-08-22 | End: 2022-08-22

## 2022-08-22 RX ORDER — PANTOPRAZOLE SODIUM 20 MG/1
40 TABLET, DELAYED RELEASE ORAL DAILY
Refills: 0 | Status: DISCONTINUED | OUTPATIENT
Start: 2022-08-22 | End: 2022-08-22

## 2022-08-22 RX ORDER — FENTANYL CITRATE 50 UG/ML
50 INJECTION INTRAVENOUS ONCE
Refills: 0 | Status: DISCONTINUED | OUTPATIENT
Start: 2022-08-22 | End: 2022-08-22

## 2022-08-22 RX ORDER — CHLORHEXIDINE GLUCONATE 213 G/1000ML
1 SOLUTION TOPICAL
Refills: 0 | Status: DISCONTINUED | OUTPATIENT
Start: 2022-08-22 | End: 2022-08-22

## 2022-08-22 RX ORDER — NIMODIPINE 60 MG/10ML
30 SOLUTION ORAL
Refills: 0 | Status: DISCONTINUED | OUTPATIENT
Start: 2022-08-22 | End: 2022-08-25

## 2022-08-22 RX ORDER — DEXTROSE 50 % IN WATER 50 %
12.5 SYRINGE (ML) INTRAVENOUS ONCE
Refills: 0 | Status: DISCONTINUED | OUTPATIENT
Start: 2022-08-22 | End: 2022-08-22

## 2022-08-22 RX ORDER — SIMVASTATIN 20 MG/1
1 TABLET, FILM COATED ORAL
Qty: 0 | Refills: 0 | DISCHARGE

## 2022-08-22 RX ORDER — SEMAGLUTIDE 0.68 MG/ML
0 INJECTION, SOLUTION SUBCUTANEOUS
Qty: 0 | Refills: 0 | DISCHARGE

## 2022-08-22 RX ORDER — CHLORHEXIDINE GLUCONATE 213 G/1000ML
15 SOLUTION TOPICAL EVERY 12 HOURS
Refills: 0 | Status: DISCONTINUED | OUTPATIENT
Start: 2022-08-22 | End: 2022-08-24

## 2022-08-22 RX ORDER — RAMIPRIL 5 MG
1 CAPSULE ORAL
Qty: 0 | Refills: 0 | DISCHARGE

## 2022-08-22 RX ORDER — ACETAMINOPHEN 500 MG
1000 TABLET ORAL EVERY 6 HOURS
Refills: 0 | Status: DISCONTINUED | OUTPATIENT
Start: 2022-08-22 | End: 2022-08-22

## 2022-08-22 RX ORDER — EMPAGLIFLOZIN 10 MG/1
1 TABLET, FILM COATED ORAL
Qty: 0 | Refills: 0 | DISCHARGE

## 2022-08-22 RX ORDER — INSULIN LISPRO 100/ML
VIAL (ML) SUBCUTANEOUS EVERY 6 HOURS
Refills: 0 | Status: DISCONTINUED | OUTPATIENT
Start: 2022-08-22 | End: 2022-08-23

## 2022-08-22 RX ORDER — CEFAZOLIN SODIUM 1 G
2000 VIAL (EA) INJECTION EVERY 8 HOURS
Refills: 0 | Status: DISCONTINUED | OUTPATIENT
Start: 2022-08-22 | End: 2022-08-22

## 2022-08-22 RX ORDER — CHLORHEXIDINE GLUCONATE 213 G/1000ML
15 SOLUTION TOPICAL EVERY 12 HOURS
Refills: 0 | Status: DISCONTINUED | OUTPATIENT
Start: 2022-08-22 | End: 2022-08-22

## 2022-08-22 RX ORDER — HYDROMORPHONE HYDROCHLORIDE 2 MG/ML
0.5 INJECTION INTRAMUSCULAR; INTRAVENOUS; SUBCUTANEOUS
Refills: 0 | Status: DISCONTINUED | OUTPATIENT
Start: 2022-08-22 | End: 2022-08-22

## 2022-08-22 RX ORDER — ENOXAPARIN SODIUM 100 MG/ML
40 INJECTION SUBCUTANEOUS EVERY 24 HOURS
Refills: 0 | Status: DISCONTINUED | OUTPATIENT
Start: 2022-08-22 | End: 2022-08-27

## 2022-08-22 RX ORDER — ACETAMINOPHEN 500 MG
975 TABLET ORAL EVERY 6 HOURS
Refills: 0 | Status: DISCONTINUED | OUTPATIENT
Start: 2022-08-22 | End: 2022-08-25

## 2022-08-22 RX ORDER — HYDROMORPHONE HYDROCHLORIDE 2 MG/ML
1 INJECTION INTRAMUSCULAR; INTRAVENOUS; SUBCUTANEOUS
Refills: 0 | Status: DISCONTINUED | OUTPATIENT
Start: 2022-08-22 | End: 2022-08-22

## 2022-08-22 RX ORDER — ONDANSETRON 8 MG/1
4 TABLET, FILM COATED ORAL ONCE
Refills: 0 | Status: DISCONTINUED | OUTPATIENT
Start: 2022-08-22 | End: 2022-08-22

## 2022-08-22 RX ORDER — PROPOFOL 10 MG/ML
50 INJECTION, EMULSION INTRAVENOUS
Qty: 1000 | Refills: 0 | Status: DISCONTINUED | OUTPATIENT
Start: 2022-08-22 | End: 2022-08-23

## 2022-08-22 RX ORDER — GLUCAGON INJECTION, SOLUTION 0.5 MG/.1ML
1 INJECTION, SOLUTION SUBCUTANEOUS ONCE
Refills: 0 | Status: DISCONTINUED | OUTPATIENT
Start: 2022-08-22 | End: 2022-08-27

## 2022-08-22 RX ORDER — FENTANYL CITRATE 50 UG/ML
0.5 INJECTION INTRAVENOUS
Qty: 2500 | Refills: 0 | Status: DISCONTINUED | OUTPATIENT
Start: 2022-08-22 | End: 2022-08-23

## 2022-08-22 RX ORDER — SODIUM CHLORIDE 9 MG/ML
75 INJECTION, SOLUTION INTRAVENOUS
Qty: 0 | Refills: 0 | DISCHARGE
Start: 2022-08-22

## 2022-08-22 RX ORDER — CHLORHEXIDINE GLUCONATE 213 G/1000ML
1 SOLUTION TOPICAL
Qty: 0 | Refills: 0 | DISCHARGE
Start: 2022-08-22

## 2022-08-22 RX ORDER — NIMODIPINE 60 MG/10ML
60 SOLUTION ORAL
Refills: 0 | Status: DISCONTINUED | OUTPATIENT
Start: 2022-08-22 | End: 2022-08-22

## 2022-08-22 RX ORDER — FENTANYL CITRATE 50 UG/ML
0.5 INJECTION INTRAVENOUS
Qty: 5000 | Refills: 0 | Status: DISCONTINUED | OUTPATIENT
Start: 2022-08-22 | End: 2022-08-22

## 2022-08-22 RX ORDER — DEXTROSE 50 % IN WATER 50 %
15 SYRINGE (ML) INTRAVENOUS ONCE
Refills: 0 | Status: DISCONTINUED | OUTPATIENT
Start: 2022-08-22 | End: 2022-08-22

## 2022-08-22 RX ORDER — PANTOPRAZOLE SODIUM 20 MG/1
40 TABLET, DELAYED RELEASE ORAL DAILY
Refills: 0 | Status: DISCONTINUED | OUTPATIENT
Start: 2022-08-22 | End: 2022-08-25

## 2022-08-22 RX ORDER — HYDROMORPHONE HYDROCHLORIDE 2 MG/ML
1 INJECTION INTRAMUSCULAR; INTRAVENOUS; SUBCUTANEOUS ONCE
Refills: 0 | Status: DISCONTINUED | OUTPATIENT
Start: 2022-08-22 | End: 2022-08-22

## 2022-08-22 RX ORDER — LEVOTHYROXINE SODIUM 125 MCG
137 TABLET ORAL DAILY
Refills: 0 | Status: DISCONTINUED | OUTPATIENT
Start: 2022-08-22 | End: 2022-08-25

## 2022-08-22 RX ORDER — CHOLECALCIFEROL (VITAMIN D3) 125 MCG
1 CAPSULE ORAL
Qty: 0 | Refills: 0 | DISCHARGE

## 2022-08-22 RX ORDER — CHLORHEXIDINE GLUCONATE 213 G/1000ML
15 SOLUTION TOPICAL
Qty: 0 | Refills: 0 | DISCHARGE
Start: 2022-08-22

## 2022-08-22 RX ORDER — NIMODIPINE 60 MG/10ML
30 SOLUTION ORAL
Refills: 0 | Status: DISCONTINUED | OUTPATIENT
Start: 2022-08-22 | End: 2022-08-22

## 2022-08-22 RX ORDER — FENTANYL CITRATE 50 UG/ML
1 INJECTION INTRAVENOUS
Qty: 2500 | Refills: 0 | Status: DISCONTINUED | OUTPATIENT
Start: 2022-08-22 | End: 2022-08-22

## 2022-08-22 RX ORDER — LEVOTHYROXINE SODIUM 125 MCG
105 TABLET ORAL AT BEDTIME
Refills: 0 | Status: DISCONTINUED | OUTPATIENT
Start: 2022-08-22 | End: 2022-08-22

## 2022-08-22 RX ORDER — PIOGLITAZONE HYDROCHLORIDE 15 MG/1
0 TABLET ORAL
Qty: 0 | Refills: 0 | DISCHARGE

## 2022-08-22 RX ADMIN — HYDROMORPHONE HYDROCHLORIDE 1 MILLIGRAM(S): 2 INJECTION INTRAMUSCULAR; INTRAVENOUS; SUBCUTANEOUS at 22:30

## 2022-08-22 RX ADMIN — PROPOFOL 29.7 MICROGRAM(S)/KG/MIN: 10 INJECTION, EMULSION INTRAVENOUS at 21:18

## 2022-08-22 RX ADMIN — Medication 25 GRAM(S): at 23:24

## 2022-08-22 RX ADMIN — Medication 102 MILLIGRAM(S): at 23:23

## 2022-08-22 RX ADMIN — Medication 6: at 18:26

## 2022-08-22 RX ADMIN — HYDROMORPHONE HYDROCHLORIDE 1 MILLIGRAM(S): 2 INJECTION INTRAMUSCULAR; INTRAVENOUS; SUBCUTANEOUS at 21:00

## 2022-08-22 RX ADMIN — FENTANYL CITRATE 4.95 MICROGRAM(S)/KG/HR: 50 INJECTION INTRAVENOUS at 21:40

## 2022-08-22 RX ADMIN — SODIUM CHLORIDE 50 MILLILITER(S): 9 INJECTION, SOLUTION INTRAVENOUS at 08:44

## 2022-08-22 RX ADMIN — HYDROMORPHONE HYDROCHLORIDE 1 MILLIGRAM(S): 2 INJECTION INTRAMUSCULAR; INTRAVENOUS; SUBCUTANEOUS at 22:45

## 2022-08-22 RX ADMIN — HYDROMORPHONE HYDROCHLORIDE 1 MILLIGRAM(S): 2 INJECTION INTRAMUSCULAR; INTRAVENOUS; SUBCUTANEOUS at 21:15

## 2022-08-22 RX ADMIN — Medication 2: at 23:23

## 2022-08-22 RX ADMIN — FENTANYL CITRATE 50 MICROGRAM(S): 50 INJECTION INTRAVENOUS at 19:40

## 2022-08-22 NOTE — BRIEF OPERATIVE NOTE - COMMENTS
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

## 2022-08-22 NOTE — CONSULT NOTE ADULT - SUBJECTIVE AND OBJECTIVE BOX
=====================================================                                                             SICU Consultation Note                                                             =====================================================  Patient is a 52y old  Male who presents with a chief complaint of       Surgery Information: Total thyroidectomy with modified right neck dissection   Complications:    Stridor after extubation, reintubated for airway protection     HISTORY  52 year old male with PMH type 2 DM, HTN, hypothyroidism, papillary thyroid cancer and dyslipidemia who presented to [facility]  for scheduled left thyroidectomy with limited neck dissection, right modified radical neck dissection.    Intraoperative course uneventful, post procedure patient reported to be moving all extremities by OR staff.  When electively extubated post operatively noted to be stridorous, immediate laryngoscopy significant for vocal cord paralysis.  Reintubated without incident and transferred to ICU for further management. Patient transferred to [facility] for extubation under supervision by ENT.     Allergies:   PAST MEDICAL & SURGICAL HISTORY:  Obesity  DM (diabetes mellitus)  Thyroid nodule  HTN (hypertension)  Hyperlipidemia  Other laceration  left middle finger laceration repair 1985      FAMILY HISTORY:  Family history of uterine cancer  mother    Family history of bladder cancer  father - also CAD, pacemaker        ALLERGIES  No Known Allergies      SOCIAL HISTORY:      HOME MEDICATIONS:       CURRENT MEDICATIONS:   --------------------------------------------------------------------------------------  Neurologic Medications    Respiratory Medications    Cardiovascular Medications    Gastrointestinal Medications    Genitourinary Medications    Hematologic/Oncologic Medications    Antimicrobial/Immunologic Medications    Endocrine/Metabolic Medications    Topical/Other Medications    --------------------------------------------------------------------------------------    VITAL SIGNS, INS/OUTS (last 24 hours):    T(C): 36.4 (08-22-22 @ 18:39), Max: 36.6 (08-22-22 @ 07:41)  HR: 87 (08-22-22 @ 18:39) (75 - 88)  BP: 117/76 (08-22-22 @ 18:39) (105/67 - 128/78)  ABP: --  ABP(mean): --  RR: 17 (08-22-22 @ 18:39) (14 - 17)  SpO2: 99% (08-22-22 @ 18:39) (96% - 100%)  --------------------------------------------------------------------------------------  ((Insert SICU Vitals / Is+Os here)) ***    --------------------------------------------------------------------------------------    EXAM  NEUROLOGY  RASS:  -3  Exam: Normal, NAD, alert, oriented x 3, no focal deficits.     HEENT  Exam: Normocephalic, atraumatic.  EOMI. Neck incision c/d/i with 2 YOLETTE bilaterally ss     RESPIRATORY  Exam:  Normal expansion/effort.    Mechanical Ventilation: 12/500/5/60    CARDIOVASCULAR  Exam: S1, S2.  Regular rate and rhythm.     GI/NUTRITION  Exam: Abdomen soft, Non-tender, Non-distended.    Current Diet:  NPO    VASCULAR  Exam: Extremities warm, pink, well-perfused.     MUSCULOSKELETAL  Exam: All extremities moving spontaneously without limitations.      SKIN:  Exam: Good skin turgor, no skin breakdown.      METABOLIC/FLUIDS/ELECTROLYTES      HEMATOLOGIC  [x] DVT Prophylaxis: lovenox     INFECTIOUS DISEASE  Antimicrobials/Immunologic Medications:      Tubes/Lines/Drains  ***  [x] Peripheral IV  [] Central Venous Line     	[] R	[] L	[] IJ	[] Fem	[] SC	Date Placed:   [] Arterial Line		[] R	[] L	[] Fem	[] Rad	[] Ax	Date Placed:   [] PICC:         	[] Midline		[] Mediport  [x] Urinary Catheter		Date Placed: 8/22    LABS  --------------------------------------------------------------------------------------  (([facility]))***    --------------------------------------------------------------------------------------    OTHER LABS    IMAGING RESULTS  Echo:   CT:   Xray:       
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 REceived 60 mg decadron at OSH and now on 10 q6.       Physical Exam  T(C): 36.4 (08-22-22 @ 18:39), Max: 36.6 (08-22-22 @ 07:41)  HR: 81 (08-22-22 @ 21:15) (75 - 90)  BP: 119/80 (08-22-22 @ 21:15) (105/67 - 135/89)  RR: 16 (08-22-22 @ 21:15) (14 - 23)  SpO2: 99% (08-22-22 @ 21:15) (96% - 100%)  General: Sedated on vent  AD: Pinna wnl, EAC clear  AS: Pinna wnl, EAC clear  Nose: nasal cavity clear bilaterally, inferior turbinates normal, mucosa normal without crusting or bleeding  OC/OP: tongue normal, floor of mouth wnl, no masses or lesions, OP clear  Neck: neck incision c/d/i, R neck YOLETTE with dark sanguinous output

## 2022-08-22 NOTE — CONSULT NOTE ADULT - SUBJECTIVE AND OBJECTIVE BOX
Source:  Providers and chart  Reliability:  Good    HPI  52 year old male with PMH type 2 DM, HTN, hypothyroidism, papillary thyroid cancer and dyslipidemia who presented to [facility]  for scheduled left thyroidectomy with limited neck dissection, right modified radical neck dissection.    Intraoperative course uneventful, post procedure patient reported to be moving all extremities by OR staff.  When electively extubated post operatively noted to be stridorous, immediate laryngoscopy significant for vocal cord paralysis.  Reintubated without incident and transferred to ICU for further management.    PMH  Papillary thyroid cancer  Hypothyroidism  DM 2   HTN  Dyslipidemia     PSH  Left middle finger laceration repair 1985    Allergies  No known drug or environmental allergies  No know intolerances    Current Medications  chlorhexidine 0.12% Liquid 15 milliLiter(s) Oral Mucosa every 12 hours  chlorhexidine 2% Cloths 1 Application(s) Topical <User Schedule>  insulin lispro (ADMELOG) corrective regimen sliding scale   SubCutaneous every 6 hours  lactated ringers. 1000 milliLiter(s) (75 mL/Hr) IV Continuous <Continuous>  propofol Infusion 50 MICROgram(s)/kG/Min (29.5 mL/Hr) IV Continuous <Continuous>  HYDROmorphone  Injectable 0.5 milliGRAM(s) IV Push every 10 minutes PRN Moderate Pain (4 - 6)  HYDROmorphone  Injectable 1 milliGRAM(s) IV Push every 10 minutes PRN Severe Pain (7 - 10)  ondansetron Injectable 4 milliGRAM(s) IV Push once PRN Nausea and/or Vomiting    Psoc  · Marital Status	Single  · Occupation	night  clear    Substance Use History:  · Substance Use	caffeine  deneis stret drug use  · Caffeine Type	tea; unsweetened    Alcohol Use History:  · Have you ever consumed alcohol	yes...  · Alcohol Type	beer; wine; liquor  · Alcohol Frequency	monthly or less  · Alcohol Amount	1-2 drinks  · Problems Related to Alcohol Use	no  · 1. Have you felt you ought to CUT down on your drinking?	no  · 2. Have people ANNOYED you by criticizing your drinking?	no  · 3. Have you ever felt bad or GUILTY about your drinking?	no  · 4. Have you ever needed an "EYE OPENER", a drink first thing in the morning to steady your nerves or get rid of a hangover?	no    Tobacco Usage:  · Tobacco Usage: Never smoker    Passive Smoke Exposure:  · Passive Smoke Exposure	No    Pfam  Family history of uterine cancer  mother    Family history of bladder cancer  father - also CAD, pacemaker    Review of Systems  Unable to elicit as patient orally intubated and sedated    Labs  `ABG - ( 22 Aug 2022 17:30 )  pH, Arterial: 7.34  pH, Blood: x     /  pCO2: 39    /  pO2: 92    / HCO3: 21    / Base Excess: -4.8  /  SaO2: 98.2      Radiology  ETT in good position   Pulmonary vascular congestion with base atelectasis    Vital Signs   T(C): 36.4 (22 Aug 2022 17:09), Max: 36.6 (22 Aug 2022 07:41)  T(F): 97.5 (22 Aug 2022 17:09), Max: 97.8 (22 Aug 2022 07:41)  HR: 85 (22 Aug 2022 18:09) (75 - 88)  BP: 107/65 (22 Aug 2022 18:09) (105/67 - 128/78)  BP(mean): 78 (22 Aug 2022 18:09) (77 - 79)  RR: 17 (22 Aug 2022 18:09) (14 - 17)  SpO2: 97% (22 Aug 2022 18:09) (96% - 100%)  Mode: AC/ CMV (Assist Control/ Continuous Mandatory Ventilation)  RR (machine): 12  TV (machine): 500  FiO2: 60  PEEP: 5  PIP: 12    Physical Exam  Gen:  WN/WD Male resting in bed, NAD  ENT:  Horizontal surgical incision approx 15 cm with steri's CDI  Thorax:  Symmetric, no retractions  Lung:  CTA b/l  CV:  S1, S2. RRR  Abd:  Soft, NT/ND.  BS normoactive, no masses to palp  Extrem:  No C/C/E, DP/radial pulses +2  Neuro:  No gross motor/sensory deficits  Psych:  Sedated, withdraws to pain

## 2022-08-22 NOTE — PATIENT PROFILE ADULT - NSPROGENOTHERPROVIDER_GEN_A_NUR
Problem: Patient Care Overview (Adult)  Goal: Plan of Care Review  Outcome: Ongoing (interventions implemented as appropriate)  Pt has slept 6.5 hours with one brief interruption so far.  NAD.  Resp even & unlabored.  Pathways clear and bed is low.  Q 15 minute safety checks ongoing.  All precautions maintained.       none

## 2022-08-22 NOTE — BRIEF OPERATIVE NOTE - NSICDXBRIEFPROCEDURE_GEN_ALL_CORE_FT
PROCEDURES:  Thyroidectomy, total, with modified radical neck dissection 22-Aug-2022 18:41:55  Mery Avelar

## 2022-08-22 NOTE — CONSULT NOTE ADULT - SUPERVISING ATTENDING
case d/w with me with Dr. Billy and brought to ICU and then transferred to [facility] for monitoring, I have not seen or evaluated patient during this hospital course.

## 2022-08-22 NOTE — CONSULT NOTE ADULT - ASSESSMENT
Assessment/Plan:  53yo M hx with papillary thyroid cancer as/p total thyroidectomy, central neck dissection on 8/22, c/b b/l TVC paresis, intubated 8/22.  - Please start nimodipine 30 mg q8 x1 week then 60 mg q8 x 12 weeks for vocal cord paresis  - Continue decadron 10mg q6 and PPI and IV synthroid  - Will trial extubation in next 1-2 days  - Records Is and Os  - ENT to follow

## 2022-08-22 NOTE — DISCHARGE NOTE PROVIDER - NSDCCPCAREPLAN_GEN_ALL_CORE_FT
PRINCIPAL DISCHARGE DIAGNOSIS  Diagnosis: Secondary and unspecified malignant neoplasm of lymph nodes  Assessment and Plan of Treatment:       SECONDARY DISCHARGE DIAGNOSES  Diagnosis: Hyperlipidemia  Assessment and Plan of Treatment:     Diagnosis: Type II diabetes mellitus  Assessment and Plan of Treatment:     Diagnosis: Hypertension  Assessment and Plan of Treatment:

## 2022-08-22 NOTE — DISCHARGE NOTE PROVIDER - CARE PROVIDER_API CALL
Joe Billy)  [facility]; Otolaryngology  [address] [place] [id number]  Phone: [phone]  Fax: [phone]  Follow Up Time:

## 2022-08-22 NOTE — PATIENT PROFILE ADULT - FALL HARM RISK - HARM RISK INTERVENTIONS

## 2022-08-22 NOTE — PATIENT PROFILE ADULT - FALL HARM RISK - UNIVERSAL INTERVENTIONS
Bed in lowest position, wheels locked, appropriate side rails in place/Call bell, personal items and telephone in reach/Instruct patient to call for assistance before getting out of bed or chair/Non-slip footwear when patient is out of bed/[place] to call system/Physically safe environment - no spills, clutter or unnecessary equipment/Purposeful Proactive Rounding/Room/bathroom lighting operational, light cord in reach

## 2022-08-22 NOTE — CONSULT NOTE ADULT - ASSESSMENT
ASSESSMENT:  52y Male ***    NEUROLOGIC   - Pain control:   -     RESPIRATORY   - Monitor SpO2 goal >92%  - Incentive spirometry     CARDIOVASCULAR   - Monitor hemodynamics   -     GASTROINTESTINAL   - Diet:   -     /RENAL   - IV fluids: LR @ 125cc/hr  - Strict I/Os  - Monitor BMP qd  - Maintain cotton catheter, strict Is/Os  - Monitor electrolytes, replete PRN    HEMATOLOGIC  - Monitor H/H   - DVT ppx: Lovenox/SQH & SCD's    INFECTIOUS DISEASE  - Monitor fever / WBC    ENDOCRINE  - Monitor gluc    LINES  - IJ CVC (  /  )  - A line (  /  )  - Cotton (  /  )  - PIV     DISPO:   --------------------------------------------------------------------------------------    Critical Care Diagnoses: vocal cord paralysis requriing mechanical ventilation  ASSESSMENT:  52 year old male with PMH type 2 DM, HTN, hypothyroidism, papillary thyroid cancer and dyslipidemia s/p total thyroidectomy with right modified neck dissection c/b stridor post extubation and found to have vocal cord paralysis on DL. Patient was reintubated and transferred to [facility] for extubation under supervision.     NEUROLOGIC   - intubated and sedated: fentanyl and precedex    - pain control: IV tylenol, dilaudid PRN     RESPIRATORY   - Monitor SpO2 goal >92%  - Incentive spirometry   - AC: 12/500/5/60  - daily ABG, CXR  - possible extubation tomorrow with ENT at bedside    CARDIOVASCULAR   - Monitor hemodynamics, MAP >65      GASTROINTESTINAL   - Diet: NPO  - protonix       /RENAL   - IV fluids: LR @ 125cc/hr  - Strict I/Os  - Monitor BMP qd  - Maintain cotton catheter, strict Is/Os  - Monitor electrolytes, replete PRN    HEMATOLOGIC  - Monitor H/H   - DVT ppx: Lovenox  & SCD's    INFECTIOUS DISEASE  - Monitor fever / WBC    ENDOCRINE  - Monitor gluc q6   - decadron 10 q 6hr     LINES  - Lopez ( 8 / 22 )  - PIV     DISPO: SICU  --------------------------------------------------------------------------------------    Critical Care Diagnoses: vocal cord paralysis requiring mechanical ventilation  ASSESSMENT:  52 year old male with PMH type 2 DM, HTN, hypothyroidism, papillary thyroid cancer and dyslipidemia s/p total thyroidectomy with right modified neck dissection c/b stridor post extubation and found to have vocal cord paralysis on DL. Patient was reintubated and transferred to [facility] for extubation under supervision.     NEUROLOGIC   - intubated and sedated: fentanyl and precedex    - pain control: IV tylenol, dilaudid PRN     RESPIRATORY   - Monitor SpO2 goal >92%  - Incentive spirometry   - AC: 12/500/5/60  - daily ABG, CXR  - possible extubation tomorrow with ENT at bedside    CARDIOVASCULAR   - Monitor hemodynamics, MAP >65      GASTROINTESTINAL   - Diet: NPO  - protonix       /RENAL   - IV fluids: LR @ 125cc/hr  - Strict I/Os  - Monitor BMP qd  - Maintain cotton catheter, strict Is/Os  - Monitor electrolytes, replete PRN    HEMATOLOGIC  - Monitor H/H   - DVT ppx: Lovenox  & SCD's    INFECTIOUS DISEASE  - Monitor fever / WBC    ENDOCRINE  - Monitor gluc q6   - ISS   - decadron 10 q 6hr   - 137 mcg synthroid PO --> 105 mcg IV     LINES  - Lopez ( 8 / 22 )  - PIV     DISPO: SICU  --------------------------------------------------------------------------------------    Critical Care Diagnoses: vocal cord paralysis requiring mechanical ventilation  ASSESSMENT:  52 year old male with PMH type 2 DM, HTN, hypothyroidism, papillary thyroid cancer and dyslipidemia s/p total thyroidectomy with right modified neck dissection c/b stridor post extubation and found to have vocal cord paralysis on DL. Patient was reintubated and transferred to [facility] for extubation under supervision.     NEUROLOGIC   - intubated and sedated: fentanyl and precedex    - pain control: IV tylenol, dilaudid PRN     RESPIRATORY   - Monitor SpO2 goal >92%  - Incentive spirometry   - AC: 12/500/5/60  - daily ABG, CXR  - possible extubation tomorrow with ENT at bedside    CARDIOVASCULAR   - Monitor hemodynamics, MAP >65  - nimodipine 30 q8 x 7 days, 60 q8 x12 weeks for vocal paralysis per ENT       GASTROINTESTINAL   - Diet: NPO  - protonix       /RENAL   - IV fluids: LR @ 125cc/hr  - Strict I/Os  - Monitor BMP qd  - Maintain cotton catheter, strict Is/Os  - Monitor electrolytes, replete PRN    HEMATOLOGIC  - Monitor H/H   - DVT ppx: Lovenox  & SCD's    INFECTIOUS DISEASE  - Monitor fever / WBC    ENDOCRINE  - Monitor gluc q6   - ISS   - decadron 10 q 6hr   - 137 mcg synthroid PO --> 105 mcg IV     LINES  - Cotton ( 8 / 22 )  - PIV     DISPO: SICU  --------------------------------------------------------------------------------------    Critical Care Diagnoses: vocal cord paralysis requiring mechanical ventilation  ASSESSMENT:  52 year old male with PMH type 2 DM, HTN, hypothyroidism, papillary thyroid cancer and dyslipidemia s/p total thyroidectomy with right modified neck dissection c/b stridor post extubation and found to have vocal cord paralysis on DL. Patient was reintubated and transferred to [facility] for extubation under supervision.     NEUROLOGIC   - intubated and sedated: fentanyl and precedex    - pain control: IV tylenol, dilaudid PRN     RESPIRATORY   - Monitor SpO2 goal >92%  - Incentive spirometry   - AC: 12/500/5/60  - daily ABG, CXR  - possible extubation tomorrow with ENT at bedside    CARDIOVASCULAR   - Monitor hemodynamics, MAP >65  - nimodipine 30 q8 x 7 days, 60 q8 x12 weeks for vocal paralysis per ENT       GASTROINTESTINAL   - Diet: NPO  - KO feeding tube   - protonix       /RENAL   - IV fluids: LR @ 125cc/hr  - Strict I/Os  - Monitor BMP qd  - Maintain cotton catheter, strict Is/Os  - Monitor electrolytes, replete PRN    HEMATOLOGIC  - Monitor H/H   - DVT ppx: Lovenox  & SCD's    INFECTIOUS DISEASE  - Monitor fever / WBC    ENDOCRINE  - Monitor gluc q6   - ISS   - decadron 10 q 6hr   - 137 mcg synthroid     LINES  - Cotton ( 8 / 22 )  - PIV     DISPO: SICU  --------------------------------------------------------------------------------------    Critical Care Diagnoses: vocal cord paralysis requiring mechanical ventilation

## 2022-08-22 NOTE — DISCHARGE NOTE PROVIDER - NSDCMRMEDTOKEN_GEN_ALL_CORE_FT
glipiZIDE 10 mg oral tablet, extended release: 1 tab(s) orally 2 times a day  Jardiance 25 mg oral tablet: 1 tab(s) orally once a day (in the morning)  metFORMIN 500 mg oral tablet, extended release: 3 tab(s) orally once a day  multivitamin: 1  orally once a day  Ozempic 2 mg/1.5 mL (1 mg dose) subcutaneous solution: subcutaneous once a week  pioglitazone 30 mg oral tablet: tab(s) orally once a day  ramipril 2.5 mg oral capsule: 1 cap(s) orally once a day  simvastatin 20 mg oral tablet: 1 tab(s) orally once a day (at bedtime)  Synthroid 50 mcg (0.05 mg) oral tablet: 1 tab(s) orally once a day  Vitamin D3 2000 intl units (50 mcg) oral tablet: 1 tab(s) orally once a day   chlorhexidine 0.12% mucous membrane liquid: 15 milliliter(s) mucous membrane every 12 hours  chlorhexidine 2% topical pad: Apply topically to affected area once a day  dexamethasone 10 mg/mL injectable solution: 10 milligram(s) intravenously every 6 hours   Lactated Ringers Injection intravenous solution: 75 milliliter(s) intravenous every hour  propofol: mcg/kg intravenous every hour

## 2022-08-22 NOTE — CONSULT NOTE ADULT - ASSESSMENT
52 year old male with PMH type 2 DM, HTN, hypothyroidism, papillary thyroid cancer and dyslipidemia who presented to [facility]  for scheduled left thyroidectomy with limited neck dissection, right modified radical neck dissection; post operative vocal cord paralysis    Plan  Continue mechanical ventilation and sedation  Decadron 10mg IV every 6 hours  Check blood sugar q6h with RISS  Continue Marroquin to gravity, monitor I&O's  Labs and wound care as per surgical team  For transfer to SICU at [facility] for extubation with ENT team present  DVT/GI ppx  This is only a summary of events during admission, please refer to full medical record for details of hospital stay

## 2022-08-22 NOTE — BRIEF OPERATIVE NOTE - ESTIMATED BLOOD LOSS
Immediate Brief Procedure Note    Patient: Oma Vargas    Pre-op Dx: Left lateral knee pain, possible lateral meniscus tear    Post-op Dx: Left knee lateral tibia plateau fissure with synovitis and patella chondral fraying.    Procedure: Left knee arthroscopy, patella chondroplasty, and synovectomy    Surgeon:  Fam Selby MD    Assistants: Drea Hernandez    Anesthesia Staff: Anesthesiologist: Jl Santana MD  Anesthesia Technician: Vita Ornelas    Anesthesia Type: general    Findings: above    Estimated Blood Loss: min    Complications: none    Specimens Removed: none  
100

## 2022-08-22 NOTE — PROGRESS NOTE ADULT - SUBJECTIVE AND OBJECTIVE BOX
Patient is a 52y old  Male who presents with a chief complaint of Thyroidectomy with limited neck dissection, right modified radical neck dissection (22 Aug 2022 18:34)    PAST MEDICAL & SURGICAL HISTORY:  Obesity      DM (diabetes mellitus)      Thyroid nodule      HTN (hypertension)      Hyperlipidemia      Other laceration  left middle finger laceration repair 1985        LITTLE BALDERAS   52y    Male    BRIEF HOSPITAL COURSE:    Review of Systems:  UATO vented    All other ROS are negative.    Allergies    No Known Allergies    Intolerances      Weight (kg): 98.2 (08-22-22 @ 10:37)  BMI (kg/m2): 31.1 (08-22-22 @ 10:37)    ICU Vital Signs Last 24 Hrs  T(C): 36.4 (22 Aug 2022 18:39), Max: 36.6 (22 Aug 2022 07:41)  T(F): 97.5 (22 Aug 2022 18:39), Max: 97.8 (22 Aug 2022 07:41)  HR: 87 (22 Aug 2022 18:39) (75 - 88)  BP: 117/76 (22 Aug 2022 18:39) (105/67 - 128/78)  BP(mean): 87 (22 Aug 2022 18:39) (77 - 87)  ABP: --  ABP(mean): --  RR: 17 (22 Aug 2022 18:39) (14 - 17)  SpO2: 99% (22 Aug 2022 18:39) (96% - 100%)    O2 Parameters below as of 22 Aug 2022 18:39  Patient On (Oxygen Delivery Method): ventilator    O2 Concentration (%): 60      Physical Examination:    General:     HEENT: ETT  R & L neck drains     PULM: bilateral BS     CVS: s1 s2 reg    ABD: soft     EXT: no edema     SKIN: warm     Neuro:  LEWIS sedate     ABG - ( 22 Aug 2022 17:30 )  pH, Arterial: 7.34  pH, Blood: x     /  pCO2: 39    /  pO2: 92    / HCO3: 21    / Base Excess: -4.8  /  SaO2: 98.2              Mode: AC/ CMV (Assist Control/ Continuous Mandatory Ventilation)  RR (machine): 12  TV (machine): 500  FiO2: 60  PEEP: 5  PIP: 12    Mode: AC/ CMV (Assist Control/ Continuous Mandatory Ventilation), RR (machine): 12, TV (machine): 500, FiO2: 60, PEEP: 5, PIP: 12  LABS:          CAPILLARY BLOOD GLUCOSE      POCT Blood Glucose.: 273 mg/dL (22 Aug 2022 17:41)  POCT Blood Glucose.: 183 mg/dL (22 Aug 2022 08:46)        CULTURES:      Medications:  MEDICATIONS  (STANDING):  ceFAZolin   IVPB 2000 milliGRAM(s) IV Intermittent every 8 hours  chlorhexidine 0.12% Liquid 15 milliLiter(s) Oral Mucosa every 12 hours  chlorhexidine 2% Cloths 1 Application(s) Topical <User Schedule>  dexAMETHasone  IVPB 10 milliGRAM(s) IV Intermittent every 6 hours  fentaNYL    Injectable 50 MICROGram(s) IV Push once  insulin lispro (ADMELOG) corrective regimen sliding scale   SubCutaneous every 6 hours  lactated ringers. 1000 milliLiter(s) (75 mL/Hr) IV Continuous <Continuous>  pantoprazole  Injectable 40 milliGRAM(s) IV Push daily  propofol Infusion 50 MICROgram(s)/kG/Min (29.5 mL/Hr) IV Continuous <Continuous>    MEDICATIONS  (PRN):  HYDROmorphone  Injectable 0.5 milliGRAM(s) IV Push every 10 minutes PRN Moderate Pain (4 - 6)  HYDROmorphone  Injectable 1 milliGRAM(s) IV Push every 10 minutes PRN Severe Pain (7 - 10)  ondansetron Injectable 4 milliGRAM(s) IV Push once PRN Nausea and/or Vomiting        08-22 @ 07:01  -  08-22 @ 19:30  --------------------------------------------------------  IN: 209.4 mL / OUT: 225 mL / NET: -15.6 mL        RADIOLOGY/IMAGING/ECHO    CXR ETT in position     Assessment/Plan:    52 year old male with PMH type 2 DM, HTN, HLD  hypothyroidism, papillary thyroid cancer.    POD#0 total thyroidectomy radical neck dissection.    Extubated then reintubated due to stridor.        Upper airway obstruction  VC paralysis post op  steroids were given     Post op hypotension distributive due to sedation    Fluid bolus given.    Being transferred to [facility] for extubation under supervision     1 dose of fentanyl as he is waking up on high dose propofol.

## 2022-08-23 LAB
BLOOD GAS ARTERIAL - LYTES,HGB,ICA,LACT RESULT: SIGNIFICANT CHANGE UP
GLUCOSE BLDC GLUCOMTR-MCNC: 194 MG/DL — HIGH (ref 70–99)
GLUCOSE BLDC GLUCOMTR-MCNC: 232 MG/DL — HIGH (ref 70–99)
GLUCOSE BLDC GLUCOMTR-MCNC: 280 MG/DL — HIGH (ref 70–99)
SARS-COV-2 RNA SPEC QL NAA+PROBE: SIGNIFICANT CHANGE UP

## 2022-08-23 PROCEDURE — 99292 CRITICAL CARE ADDL 30 MIN: CPT

## 2022-08-23 PROCEDURE — 99291 CRITICAL CARE FIRST HOUR: CPT

## 2022-08-23 RX ORDER — HYDROMORPHONE HYDROCHLORIDE 2 MG/ML
1 INJECTION INTRAMUSCULAR; INTRAVENOUS; SUBCUTANEOUS ONCE
Refills: 0 | Status: DISCONTINUED | OUTPATIENT
Start: 2022-08-23 | End: 2022-08-23

## 2022-08-23 RX ORDER — HYDROMORPHONE HYDROCHLORIDE 2 MG/ML
0.5 INJECTION INTRAMUSCULAR; INTRAVENOUS; SUBCUTANEOUS EVERY 4 HOURS
Refills: 0 | Status: DISCONTINUED | OUTPATIENT
Start: 2022-08-23 | End: 2022-08-24

## 2022-08-23 RX ORDER — DEXMEDETOMIDINE HYDROCHLORIDE IN 0.9% SODIUM CHLORIDE 4 UG/ML
0.2 INJECTION INTRAVENOUS
Qty: 400 | Refills: 0 | Status: DISCONTINUED | OUTPATIENT
Start: 2022-08-23 | End: 2022-08-24

## 2022-08-23 RX ORDER — INSULIN LISPRO 100/ML
VIAL (ML) SUBCUTANEOUS EVERY 6 HOURS
Refills: 0 | Status: DISCONTINUED | OUTPATIENT
Start: 2022-08-23 | End: 2022-08-25

## 2022-08-23 RX ORDER — HYDROMORPHONE HYDROCHLORIDE 2 MG/ML
1 INJECTION INTRAMUSCULAR; INTRAVENOUS; SUBCUTANEOUS EVERY 4 HOURS
Refills: 0 | Status: DISCONTINUED | OUTPATIENT
Start: 2022-08-23 | End: 2022-08-24

## 2022-08-23 RX ADMIN — Medication 3: at 18:06

## 2022-08-23 RX ADMIN — HYDROMORPHONE HYDROCHLORIDE 1 MILLIGRAM(S): 2 INJECTION INTRAMUSCULAR; INTRAVENOUS; SUBCUTANEOUS at 05:15

## 2022-08-23 RX ADMIN — CHLORHEXIDINE GLUCONATE 15 MILLILITER(S): 213 SOLUTION TOPICAL at 05:35

## 2022-08-23 RX ADMIN — Medication 2: at 12:24

## 2022-08-23 RX ADMIN — CHLORHEXIDINE GLUCONATE 15 MILLILITER(S): 213 SOLUTION TOPICAL at 18:14

## 2022-08-23 RX ADMIN — NIMODIPINE 30 MILLIGRAM(S): 60 SOLUTION ORAL at 09:03

## 2022-08-23 RX ADMIN — HYDROMORPHONE HYDROCHLORIDE 1 MILLIGRAM(S): 2 INJECTION INTRAMUSCULAR; INTRAVENOUS; SUBCUTANEOUS at 05:00

## 2022-08-23 RX ADMIN — Medication 102 MILLIGRAM(S): at 12:12

## 2022-08-23 RX ADMIN — NIMODIPINE 30 MILLIGRAM(S): 60 SOLUTION ORAL at 18:05

## 2022-08-23 RX ADMIN — Medication 975 MILLIGRAM(S): at 05:35

## 2022-08-23 RX ADMIN — Medication 975 MILLIGRAM(S): at 13:15

## 2022-08-23 RX ADMIN — Medication 975 MILLIGRAM(S): at 18:05

## 2022-08-23 RX ADMIN — Medication 102 MILLIGRAM(S): at 05:34

## 2022-08-23 RX ADMIN — NIMODIPINE 30 MILLIGRAM(S): 60 SOLUTION ORAL at 00:28

## 2022-08-23 RX ADMIN — Medication 102 MILLIGRAM(S): at 18:05

## 2022-08-23 RX ADMIN — PROPOFOL 29.7 MICROGRAM(S)/KG/MIN: 10 INJECTION, EMULSION INTRAVENOUS at 07:26

## 2022-08-23 RX ADMIN — FENTANYL CITRATE 4.95 MICROGRAM(S)/KG/HR: 50 INJECTION INTRAVENOUS at 07:26

## 2022-08-23 RX ADMIN — ENOXAPARIN SODIUM 40 MILLIGRAM(S): 100 INJECTION SUBCUTANEOUS at 05:34

## 2022-08-23 RX ADMIN — DEXMEDETOMIDINE HYDROCHLORIDE IN 0.9% SODIUM CHLORIDE 4.95 MICROGRAM(S)/KG/HR: 4 INJECTION INTRAVENOUS at 08:34

## 2022-08-23 RX ADMIN — Medication 975 MILLIGRAM(S): at 00:28

## 2022-08-23 RX ADMIN — Medication 975 MILLIGRAM(S): at 12:13

## 2022-08-23 RX ADMIN — Medication 1: at 05:36

## 2022-08-23 RX ADMIN — PANTOPRAZOLE SODIUM 40 MILLIGRAM(S): 20 TABLET, DELAYED RELEASE ORAL at 12:12

## 2022-08-23 RX ADMIN — SODIUM CHLORIDE 125 MILLILITER(S): 9 INJECTION, SOLUTION INTRAVENOUS at 07:26

## 2022-08-23 RX ADMIN — Medication 137 MICROGRAM(S): at 05:35

## 2022-08-23 NOTE — PROGRESS NOTE ADULT - SUBJECTIVE AND OBJECTIVE BOX
24 HOUR EVENTS:  - ko feeding tube placed, position confirmed on xray     SUBJECTIVE/ROS:  Patient seen at bedside this AM.     [x] Due to altered mental status/intubation, subjective information were not able to be obtained from the patient. History was obtained, to the extent possible, from review of the chart and collateral sources of information.    OBJECTIVE:    VITALS:  Vital Signs Last 24 Hrs  T(C): 36.4 (23 Aug 2022 00:00), Max: 36.6 (22 Aug 2022 07:41)  T(F): 97.6 (23 Aug 2022 00:00), Max: 97.8 (22 Aug 2022 07:41)  HR: 69 (23 Aug 2022 00:00) (69 - 90)  BP: 88/57 (23 Aug 2022 00:00) (88/57 - 135/89)  BP(mean): 67 (23 Aug 2022 00:00) (67 - 103)  RR: 12 (23 Aug 2022 00:00) (12 - 23)  SpO2: 99% (23 Aug 2022 00:00) (96% - 100%)    Parameters below as of 23 Aug 2022 00:00  Patient On (Oxygen Delivery Method): ventilator    O2 Concentration (%): 40  Mode: AC/ CMV (Assist Control/ Continuous Mandatory Ventilation)  RR (machine): 12  TV (machine): 500  FiO2: 60  PEEP: 5  ITime: 0.82  MAP: 8  PIP: 12    I&O's Summary    22 Aug 2022 07:01  -  23 Aug 2022 00:43  --------------------------------------------------------  IN: 813.8 mL / OUT: 450 mL / NET: 363.8 mL        PHYSICAL EXAM:    NEURO  RASS:     GCS:     CAM ICU:  Exam: awake, alert, oriented    RESPIRATORY  Exam: no increased WOB on RA  Mechanical Ventilation: Mode: AC/ CMV (Assist Control/ Continuous Mandatory Ventilation), RR (machine): 12, RR (patient): 27, TV (machine): 500, FiO2: 60, PEEP: 5, ITime: 0.82, MAP: 8, PIP: 12    CARDIOVASCULAR  Exam: warm, well-perfused  Cardiac Rhythm: normal sinus rhythm noted on cardiac monitor    GI/NUTRITION  Exam: soft, non-distended, non-tender    GENITOURINARY  Exam:     ACCESS DEVICES:  [ ] Peripheral IV  [ ] Central Venous Line	[ ] R	[ ] L	[ ] IJ	[ ] Fem	[ ] SC	Placed:   [ ] Arterial Line		[ ] R	[ ] L	[ ] Fem	[ ] Rad	[ ] Ax	Placed:   [ ] PICC:					[ ] Mediport  [ ] Urinary Catheter, Date Placed:   [x] Necessity of urinary, arterial, and venous catheters discussed      LABS:                        14.1   13.61 )-----------( 209      ( 22 Aug 2022 21:30 )             40.7   08-22    135  |  105  |  22  ----------------------------<  258<H>  4.2   |  18<L>  |  0.99    Ca    8.3<L>      22 Aug 2022 21:30  Phos  4.1     08-22  Mg     1.70     08-22      CAPILLARY BLOOD GLUCOSE      POCT Blood Glucose.: 209 mg/dL (22 Aug 2022 23:21)  POCT Blood Glucose.: 273 mg/dL (22 Aug 2022 17:41)  POCT Blood Glucose.: 183 mg/dL (22 Aug 2022 08:46)      MEDICATIONS:   MEDICATIONS  (STANDING):  acetaminophen    Suspension .. 975 milliGRAM(s) Oral every 6 hours  chlorhexidine 0.12% Liquid 15 milliLiter(s) Oral Mucosa every 12 hours  dexAMETHasone  IVPB 10 milliGRAM(s) IV Intermittent every 6 hours  enoxaparin Injectable 40 milliGRAM(s) SubCutaneous every 24 hours  fentaNYL   Infusion 0.5 MICROgram(s)/kG/Hr (4.95 mL/Hr) IV Continuous <Continuous>  glucagon  Injectable 1 milliGRAM(s) IntraMuscular once  insulin lispro (ADMELOG) corrective regimen sliding scale   SubCutaneous every 6 hours  lactated ringers. 1000 milliLiter(s) (125 mL/Hr) IV Continuous <Continuous>  levothyroxine 137 MICROGram(s) Oral daily  niMODipine Oral Solution 30 milliGRAM(s) Enteral Tube <User Schedule>  pantoprazole   Suspension 40 milliGRAM(s) Oral daily  propofol Infusion 50 MICROgram(s)/kG/Min (29.7 mL/Hr) IV Continuous <Continuous>    MEDICATIONS  (PRN):      IMAGING:

## 2022-08-23 NOTE — PROGRESS NOTE ADULT - SUBJECTIVE AND OBJECTIVE BOX
Patient seen and examined at bedside. Patient intubated.     ICU Vital Signs Last 24 Hrs  T(C): 36.2 (23 Aug 2022 04:00), Max: 36.6 (22 Aug 2022 07:41)  T(F): 97.2 (23 Aug 2022 04:00), Max: 97.8 (22 Aug 2022 07:41)  HR: 77 (23 Aug 2022 06:00) (65 - 90)  BP: 109/73 (23 Aug 2022 06:00) (88/57 - 135/89)  BP(mean): 84 (23 Aug 2022 06:00) (67 - 103)  ABP: --  ABP(mean): --  RR: 15 (23 Aug 2022 06:00) (12 - 24)  SpO2: 99% (23 Aug 2022 06:00) (95% - 100%)    O2 Parameters below as of 23 Aug 2022 04:00  Patient On (Oxygen Delivery Method): ventilator    O2 Concentration (%): 40    General: Sedated on vent  AD: Pinna wnl, EAC clear  AS: Pinna wnl, EAC clear  Nose: nasal cavity clear bilaterally, inferior turbinates normal, mucosa normal without crusting or bleeding  OC/OP: tongue normal, floor of mouth wnl, no masses or lesions, OP clear  Neck: neck incision c/d/i, R neck YOLETTE with dark sanguinous output      Assessment and Recommendation:   · Assessment	  Assessment/Plan:  51yo M hx with papillary thyroid cancer as/p total thyroidectomy, central neck dissection on 8/22, c/b b/l TVC paresis, intubated 8/22.  - Please start nimodipine 30 mg q8 x1 week then 60 mg q8 x 12 weeks for vocal cord paresis  - Continue decadron 10mg q6 and PPI and IV synthroid  - Will trial extubation. likely Wednesday  - Records Is and Os  - ENT to follow

## 2022-08-23 NOTE — PROGRESS NOTE ADULT - ASSESSMENT
ASSESSMENT:  52 year old male with PMH type 2 DM, HTN, hypothyroidism, papillary thyroid cancer and dyslipidemia s/p total thyroidectomy with right modified neck dissection c/b stridor post extubation and found to have vocal cord paralysis on DL. Patient was reintubated and transferred to [facility] for extubation under supervision.     NEUROLOGIC   - intubated and sedated: fentanyl and precedex    - pain control: IV tylenol, dilaudid PRN     RESPIRATORY   - Monitor SpO2 goal >92%  - Incentive spirometry   - AC: 12/500/5/60  - daily ABG, CXR  - possible extubation tomorrow with ENT at bedside    CARDIOVASCULAR   - Monitor hemodynamics, MAP >65  - nimodipine 30 q8 x 7 days, 60 q8 x12 weeks for vocal paralysis per ENT       GASTROINTESTINAL   - Diet: NPO  - KO feeding tube   - protonix       /RENAL   - IV fluids: LR @ 125cc/hr  - Strict I/Os  - Monitor BMP qd  - Maintain cotton catheter, strict Is/Os  - Monitor electrolytes, replete PRN    HEMATOLOGIC  - Monitor H/H   - DVT ppx: Lovenox  & SCD's    INFECTIOUS DISEASE  - Monitor fever / WBC    ENDOCRINE  - Monitor gluc q6   - ISS   - decadron 10 q 6hr   - 137 mcg synthroid     LINES  - Cotton ( 8 / 22 )  - PIV     DISPO: SICU  --------------------------------------------------------------------------------------    Critical Care Diagnoses: vocal cord paralysis requiring mechanical ventilation  ASSESSMENT:  52 year old male with PMH type 2 DM, HTN, hypothyroidism, papillary thyroid cancer and dyslipidemia s/p total thyroidectomy with right modified neck dissection c/b stridor post extubation and found to have vocal cord paralysis on DL. Patient was reintubated and transferred to [facility] for extubation under supervision.     NEUROLOGIC   - intubated and sedated: d/c fentanyl and propofol, start precedex  - pain control: IV tylenol, dilaudid PRN     RESPIRATORY   - air leak 90%; plan for extubation with ENT bedside today  - Monitor SpO2 goal >92%  - Incentive spirometry   - AC: 12/500/5/40  - daily ABG, CXR    CARDIOVASCULAR   - Monitor hemodynamics, MAP >65  - nimodipine 30 q8 x 7 days, 60 q8 x12 weeks for vocal paralysis per ENT       GASTROINTESTINAL   - Diet: NPO  - KO feeding tube   - protonix       /RENAL   - IV fluids: LR @ 125cc/hr  - Strict I/Os  - Monitor BMP qd  - Maintain cotton catheter, strict Is/Os  - Monitor electrolytes, replete PRN    HEMATOLOGIC  - Monitor H/H   - DVT ppx: Lovenox  & SCD's    INFECTIOUS DISEASE  - Monitor fever / WBC    ENDOCRINE  - Monitor gluc q6   - ISS   - decadron 10 q 6hr   - 137 mcg synthroid     LINES  - Cotton ( 8 / 22 )  - PIV   - YOLETTE drains x 2 (right / left)    DISPO: SICU  --------------------------------------------------------------------------------------    Critical Care Diagnoses: vocal cord paralysis requiring mechanical ventilation  ASSESSMENT:  52 year old male with PMH type 2 DM, HTN, hypothyroidism, papillary thyroid cancer and dyslipidemia s/p total thyroidectomy with right modified neck dissection c/b stridor post extubation and found to have vocal cord paralysis on DL. Patient was reintubated and transferred to [facility] for extubation under supervision.     NEUROLOGIC   - intubated and sedated: d/c fentanyl and propofol, start precedex  - pain control: IV tylenol, dilaudid PRN     RESPIRATORY   - air leak 90%; plan for extubation with ENT bedside tomorrow  - Monitor SpO2 goal >92%  - Incentive spirometry   - AC: 12/500/5/40, CPAP as tolerated  - daily ABG, CXR    CARDIOVASCULAR   - Monitor hemodynamics, MAP >65  - nimodipine 30 q8 x 7 days, 60 q8 x12 weeks for vocal paralysis per ENT       GASTROINTESTINAL   - Diet: NPO  - KO feeding tube   - protonix       /RENAL   - IV fluids: LR @ 125cc/hr  - Strict I/Os  - Monitor BMP qd  - Maintain cotton catheter, strict Is/Os  - Monitor electrolytes, replete PRN    HEMATOLOGIC  - Monitor H/H   - DVT ppx: Lovenox  & SCD's    INFECTIOUS DISEASE  - Monitor fever / WBC    ENDOCRINE  - Monitor gluc q6   - ISS   - decadron 10 q 6hr   - 137 mcg synthroid     LINES  - Cotton ( 8 / 22 )  - PIV   - YOLETTE drains x 2 (right / left)    DISPO: SICU  --------------------------------------------------------------------------------------    Critical Care Diagnoses: vocal cord paralysis requiring mechanical ventilation

## 2022-08-23 NOTE — PROGRESS NOTE ADULT - SUBJECTIVE AND OBJECTIVE BOX
24 HOUR EVENTS:  - ko feeding tube placed, position confirmed on xray     SUBJECTIVE/ROS:  Patient seen at bedside this AM.     [x] Due to altered mental status/intubation, subjective information were not able to be obtained from the patient. History was obtained, to the extent possible, from review of the chart and collateral sources of information.    OBJECTIVE:    VITALS:  ICU Vital Signs Last 24 Hrs  T(C): 36.4 (23 Aug 2022 12:00), Max: 36.4 (22 Aug 2022 17:09)  T(F): 97.6 (23 Aug 2022 12:00), Max: 97.6 (23 Aug 2022 00:00)  HR: 75 (23 Aug 2022 14:37) (65 - 90)  BP: 130/85 (23 Aug 2022 13:00) (88/57 - 135/89)  BP(mean): 99 (23 Aug 2022 13:00) (67 - 103)  ABP: --  ABP(mean): --  RR: 14 (23 Aug 2022 13:00) (10 - 24)  SpO2: 95% (23 Aug 2022 14:37) (93% - 100%)    O2 Parameters below as of 23 Aug 2022 10:00  Patient On (Oxygen Delivery Method): ventilator,CPAP 5/5    O2 Concentration (%): 40    Mode: CPAP with PS  FiO2: 21  PEEP: 5  PS: 5  MAP: 7  PIP: 11        I&O's Summary    22 Aug 2022 07:01  -  23 Aug 2022 07:00  --------------------------------------------------------  IN: 2047.8 mL / OUT: 1935 mL / NET: 112.8 mL    23 Aug 2022 07:01  -  23 Aug 2022 15:44  --------------------------------------------------------  IN: 652.4 mL / OUT: 870 mL / NET: -217.6 mL          PHYSICAL EXAM:    NEURO  RASS:     GCS:     CAM ICU:  Exam: awake, alert, oriented    RESPIRATORY  Exam: no increased WOB on RA  Mechanical Ventilation: Mode: AC/ CMV (Assist Control/ Continuous Mandatory Ventilation), RR (machine): 12, RR (patient): 27, TV (machine): 500, FiO2: 60, PEEP: 5, ITime: 0.82, MAP: 8, PIP: 12    CARDIOVASCULAR  Exam: warm, well-perfused  Cardiac Rhythm: normal sinus rhythm noted on cardiac monitor    GI/NUTRITION  Exam: soft, non-distended, non-tender    GENITOURINARY  Exam:     ACCESS DEVICES:  [ ] Peripheral IV  [ ] Central Venous Line	[ ] R	[ ] L	[ ] IJ	[ ] Fem	[ ] SC	Placed:   [ ] Arterial Line		[ ] R	[ ] L	[ ] Fem	[ ] Rad	[ ] Ax	Placed:   [ ] PICC:					[ ] Mediport  [ ] Urinary Catheter, Date Placed:   [x] Necessity of urinary, arterial, and venous catheters discussed      LABS:                        14.1   13.61 )-----------( 209      ( 22 Aug 2022 21:30 )             40.7     08-22    135  |  105  |  22  ----------------------------<  258<H>  4.2   |  18<L>  |  0.99    Ca    8.3<L>      22 Aug 2022 21:30  Phos  4.1     08-22  Mg     1.70     08-22      PT/INR - ( 22 Aug 2022 21:30 )   PT: 12.1 sec;   INR: 1.04 ratio         PTT - ( 22 Aug 2022 21:30 )  PTT:26.3 sec      CAPILLARY BLOOD GLUCOSE      POCT Blood Glucose.: 232 mg/dL (23 Aug 2022 12:22)  POCT Blood Glucose.: 194 mg/dL (23 Aug 2022 05:30)  POCT Blood Glucose.: 209 mg/dL (22 Aug 2022 23:21)  POCT Blood Glucose.: 273 mg/dL (22 Aug 2022 17:41)      MEDICATIONS:   MEDICATIONS  (STANDING):  acetaminophen    Suspension .. 975 milliGRAM(s) Oral every 6 hours  chlorhexidine 0.12% Liquid 15 milliLiter(s) Oral Mucosa every 12 hours  dexAMETHasone  IVPB 10 milliGRAM(s) IV Intermittent every 6 hours  dexMEDEtomidine Infusion 0.2 MICROgram(s)/kG/Hr (4.95 mL/Hr) IV Continuous <Continuous>  enoxaparin Injectable 40 milliGRAM(s) SubCutaneous every 24 hours  glucagon  Injectable 1 milliGRAM(s) IntraMuscular once  insulin lispro (ADMELOG) corrective regimen sliding scale   SubCutaneous every 6 hours  lactated ringers. 1000 milliLiter(s) (75 mL/Hr) IV Continuous <Continuous>  levothyroxine 137 MICROGram(s) Oral daily  niMODipine Oral Solution 30 milliGRAM(s) Enteral Tube <User Schedule>  pantoprazole   Suspension 40 milliGRAM(s) Oral daily  propofol Infusion 50 MICROgram(s)/kG/Min (29.7 mL/Hr) IV Continuous <Continuous>    MEDICATIONS  (PRN):  HYDROmorphone  Injectable 0.5 milliGRAM(s) IV Push every 4 hours PRN Moderate Pain (4 - 6)  HYDROmorphone  Injectable 1 milliGRAM(s) IV Push every 4 hours PRN Severe Pain (7 - 10)        IMAGING: 24 HOUR EVENTS:  - ko feeding tube placed, position confirmed on xray   - tolerating CPAP   - LR@75cc/hr    SUBJECTIVE/ROS:  Patient seen at bedside this AM.     [x] Due to altered mental status/intubation, subjective information were not able to be obtained from the patient. History was obtained, to the extent possible, from review of the chart and collateral sources of information.    OBJECTIVE:    VITALS:  ICU Vital Signs Last 24 Hrs  T(C): 36.4 (23 Aug 2022 12:00), Max: 36.4 (22 Aug 2022 17:09)  T(F): 97.6 (23 Aug 2022 12:00), Max: 97.6 (23 Aug 2022 00:00)  HR: 75 (23 Aug 2022 14:37) (65 - 90)  BP: 130/85 (23 Aug 2022 13:00) (88/57 - 135/89)  BP(mean): 99 (23 Aug 2022 13:00) (67 - 103)  ABP: --  ABP(mean): --  RR: 14 (23 Aug 2022 13:00) (10 - 24)  SpO2: 95% (23 Aug 2022 14:37) (93% - 100%)    O2 Parameters below as of 23 Aug 2022 10:00  Patient On (Oxygen Delivery Method): ventilator,CPAP 5/5    O2 Concentration (%): 40    Mode: CPAP with PS  FiO2: 21  PEEP: 5  PS: 5  MAP: 7  PIP: 11        I&O's Summary    22 Aug 2022 07:01  -  23 Aug 2022 07:00  --------------------------------------------------------  IN: 2047.8 mL / OUT: 1935 mL / NET: 112.8 mL    23 Aug 2022 07:01  -  23 Aug 2022 15:44  --------------------------------------------------------  IN: 652.4 mL / OUT: 870 mL / NET: -217.6 mL          PHYSICAL EXAM:    NEURO  RASS:     GCS:     CAM ICU:  Exam: awake, alert, oriented    RESPIRATORY  Exam: no increased WOB on RA  Mechanical Ventilation: Mode: AC/ CMV (Assist Control/ Continuous Mandatory Ventilation), RR (machine): 12, RR (patient): 27, TV (machine): 500, FiO2: 60, PEEP: 5, ITime: 0.82, MAP: 8, PIP: 12    CARDIOVASCULAR  Exam: warm, well-perfused  Cardiac Rhythm: normal sinus rhythm noted on cardiac monitor    GI/NUTRITION  Exam: soft, non-distended, non-tender    GENITOURINARY  Exam:     ACCESS DEVICES:  [ ] Peripheral IV  [ ] Central Venous Line	[ ] R	[ ] L	[ ] IJ	[ ] Fem	[ ] SC	Placed:   [ ] Arterial Line		[ ] R	[ ] L	[ ] Fem	[ ] Rad	[ ] Ax	Placed:   [ ] PICC:					[ ] Mediport  [ ] Urinary Catheter, Date Placed:   [x] Necessity of urinary, arterial, and venous catheters discussed      LABS:                        14.1   13.61 )-----------( 209      ( 22 Aug 2022 21:30 )             40.7     08-22    135  |  105  |  22  ----------------------------<  258<H>  4.2   |  18<L>  |  0.99    Ca    8.3<L>      22 Aug 2022 21:30  Phos  4.1     08-22  Mg     1.70     08-22      PT/INR - ( 22 Aug 2022 21:30 )   PT: 12.1 sec;   INR: 1.04 ratio         PTT - ( 22 Aug 2022 21:30 )  PTT:26.3 sec      CAPILLARY BLOOD GLUCOSE      POCT Blood Glucose.: 232 mg/dL (23 Aug 2022 12:22)  POCT Blood Glucose.: 194 mg/dL (23 Aug 2022 05:30)  POCT Blood Glucose.: 209 mg/dL (22 Aug 2022 23:21)  POCT Blood Glucose.: 273 mg/dL (22 Aug 2022 17:41)      MEDICATIONS:   MEDICATIONS  (STANDING):  acetaminophen    Suspension .. 975 milliGRAM(s) Oral every 6 hours  chlorhexidine 0.12% Liquid 15 milliLiter(s) Oral Mucosa every 12 hours  dexAMETHasone  IVPB 10 milliGRAM(s) IV Intermittent every 6 hours  dexMEDEtomidine Infusion 0.2 MICROgram(s)/kG/Hr (4.95 mL/Hr) IV Continuous <Continuous>  enoxaparin Injectable 40 milliGRAM(s) SubCutaneous every 24 hours  glucagon  Injectable 1 milliGRAM(s) IntraMuscular once  insulin lispro (ADMELOG) corrective regimen sliding scale   SubCutaneous every 6 hours  lactated ringers. 1000 milliLiter(s) (75 mL/Hr) IV Continuous <Continuous>  levothyroxine 137 MICROGram(s) Oral daily  niMODipine Oral Solution 30 milliGRAM(s) Enteral Tube <User Schedule>  pantoprazole   Suspension 40 milliGRAM(s) Oral daily  propofol Infusion 50 MICROgram(s)/kG/Min (29.7 mL/Hr) IV Continuous <Continuous>    MEDICATIONS  (PRN):  HYDROmorphone  Injectable 0.5 milliGRAM(s) IV Push every 4 hours PRN Moderate Pain (4 - 6)  HYDROmorphone  Injectable 1 milliGRAM(s) IV Push every 4 hours PRN Severe Pain (7 - 10)        IMAGING: 24 HOUR EVENTS:  - ko feeding tube placed, position confirmed on xray   - tolerating CPAP   - LR@75cc/hr    SUBJECTIVE/ROS:  Patient seen at bedside this AM.     [x] Due to altered mental status/intubation, subjective information were not able to be obtained from the patient. History was obtained, to the extent possible, from review of the chart and collateral sources of information.    OBJECTIVE:    VITALS:  ICU Vital Signs Last 24 Hrs  T(C): 36.4 (23 Aug 2022 12:00), Max: 36.4 (22 Aug 2022 17:09)  T(F): 97.6 (23 Aug 2022 12:00), Max: 97.6 (23 Aug 2022 00:00)  HR: 75 (23 Aug 2022 14:37) (65 - 90)  BP: 130/85 (23 Aug 2022 13:00) (88/57 - 135/89)  BP(mean): 99 (23 Aug 2022 13:00) (67 - 103)  ABP: --  ABP(mean): --  RR: 14 (23 Aug 2022 13:00) (10 - 24)  SpO2: 95% (23 Aug 2022 14:37) (93% - 100%)    O2 Parameters below as of 23 Aug 2022 10:00  Patient On (Oxygen Delivery Method): ventilator,CPAP 5/5    O2 Concentration (%): 40    Mode: CPAP with PS  FiO2: 21  PEEP: 5  PS: 5  MAP: 7  PIP: 11        I&O's Summary    22 Aug 2022 07:01  -  23 Aug 2022 07:00  --------------------------------------------------------  IN: 2047.8 mL / OUT: 1935 mL / NET: 112.8 mL    23 Aug 2022 07:01  -  23 Aug 2022 15:44  --------------------------------------------------------  IN: 652.4 mL / OUT: 870 mL / NET: -217.6 mL          PHYSICAL EXAM:    NEURO  RASS:     GCS:     CAM ICU:  Exam: awake, alert, oriented    RESPIRATORY  Exam: no increased WOB on RA  Mechanical Ventilation: Mode: CPAP with PS, PEEP: 5, PS: 5, MAP: 7, PIP  11, FiO2: 21    CARDIOVASCULAR  Exam: warm, well-perfused  Cardiac Rhythm: normal sinus rhythm noted on cardiac monitor    GI/NUTRITION  Exam: soft, non-distended, non-tender    GENITOURINARY  Exam:     ACCESS DEVICES:  [ ] Peripheral IV  [ ] Central Venous Line	[ ] R	[ ] L	[ ] IJ	[ ] Fem	[ ] SC	Placed:   [ ] Arterial Line		[ ] R	[ ] L	[ ] Fem	[ ] Rad	[ ] Ax	Placed:   [ ] PICC:					[ ] Mediport  [ ] Urinary Catheter, Date Placed:   [x] Necessity of urinary, arterial, and venous catheters discussed      LABS:                        14.1   13.61 )-----------( 209      ( 22 Aug 2022 21:30 )             40.7     08-22    135  |  105  |  22  ----------------------------<  258<H>  4.2   |  18<L>  |  0.99    Ca    8.3<L>      22 Aug 2022 21:30  Phos  4.1     08-22  Mg     1.70     08-22      PT/INR - ( 22 Aug 2022 21:30 )   PT: 12.1 sec;   INR: 1.04 ratio         PTT - ( 22 Aug 2022 21:30 )  PTT:26.3 sec      CAPILLARY BLOOD GLUCOSE      POCT Blood Glucose.: 232 mg/dL (23 Aug 2022 12:22)  POCT Blood Glucose.: 194 mg/dL (23 Aug 2022 05:30)  POCT Blood Glucose.: 209 mg/dL (22 Aug 2022 23:21)  POCT Blood Glucose.: 273 mg/dL (22 Aug 2022 17:41)      MEDICATIONS:   MEDICATIONS  (STANDING):  acetaminophen    Suspension .. 975 milliGRAM(s) Oral every 6 hours  chlorhexidine 0.12% Liquid 15 milliLiter(s) Oral Mucosa every 12 hours  dexAMETHasone  IVPB 10 milliGRAM(s) IV Intermittent every 6 hours  dexMEDEtomidine Infusion 0.2 MICROgram(s)/kG/Hr (4.95 mL/Hr) IV Continuous <Continuous>  enoxaparin Injectable 40 milliGRAM(s) SubCutaneous every 24 hours  glucagon  Injectable 1 milliGRAM(s) IntraMuscular once  insulin lispro (ADMELOG) corrective regimen sliding scale   SubCutaneous every 6 hours  lactated ringers. 1000 milliLiter(s) (75 mL/Hr) IV Continuous <Continuous>  levothyroxine 137 MICROGram(s) Oral daily  niMODipine Oral Solution 30 milliGRAM(s) Enteral Tube <User Schedule>  pantoprazole   Suspension 40 milliGRAM(s) Oral daily  propofol Infusion 50 MICROgram(s)/kG/Min (29.7 mL/Hr) IV Continuous <Continuous>    MEDICATIONS  (PRN):  HYDROmorphone  Injectable 0.5 milliGRAM(s) IV Push every 4 hours PRN Moderate Pain (4 - 6)  HYDROmorphone  Injectable 1 milliGRAM(s) IV Push every 4 hours PRN Severe Pain (7 - 10)        IMAGING:

## 2022-08-23 NOTE — PROGRESS NOTE ADULT - ASSESSMENT
ASSESSMENT:  52 year old male with PMH type 2 DM, HTN, hypothyroidism, papillary thyroid cancer and dyslipidemia s/p total thyroidectomy with right modified neck dissection c/b stridor post extubation and found to have vocal cord paralysis on DL. Patient was reintubated and transferred to [facility] for extubation under supervision.     NEUROLOGIC   - intubated and sedated: d/c fentanyl and propofol, start precedex  - pain control: IV tylenol, dilaudid PRN     RESPIRATORY   - air leak 90%; plan for extubation with ENT bedside tomorrow  - Monitor SpO2 goal >92%  - Incentive spirometry   - AC: 12/500/5/40, CPAP as tolerated  - daily ABG, CXR    CARDIOVASCULAR   - Monitor hemodynamics, MAP >65  - nimodipine 30 q8 x 7 days, 60 q8 x12 weeks for vocal paralysis per ENT       GASTROINTESTINAL   - Diet: NPO  - KO feeding tube   - protonix       /RENAL   - IV fluids: LR @ 125cc/hr  - Strict I/Os  - Monitor BMP qd  - Maintain cotton catheter, strict Is/Os  - Monitor electrolytes, replete PRN    HEMATOLOGIC  - Monitor H/H   - DVT ppx: Lovenox  & SCD's    INFECTIOUS DISEASE  - Monitor fever / WBC    ENDOCRINE  - Monitor gluc q6   - ISS   - decadron 10 q 6hr   - 137 mcg synthroid     LINES  - Cotton ( 8 / 22 )  - PIV   - YOLETTE drains x 2 (right / left)    DISPO: SICU  --------------------------------------------------------------------------------------    Critical Care Diagnoses: vocal cord paralysis requiring mechanical ventilation  ASSESSMENT:  52 year old male with PMH type 2 DM, HTN, hypothyroidism, papillary thyroid cancer and dyslipidemia s/p total thyroidectomy with right modified neck dissection c/b stridor post extubation and found to have vocal cord paralysis on DL. Patient was reintubated and transferred to [facility] for extubation under supervision.     NEUROLOGIC   - intubated and sedated: on precedex drip, wean propofol  - pain control: IV tylenol, dilaudid PRN     RESPIRATORY   - air leak 90%; plan for extubation with ENT bedside tomorrow, plan for repeat scope today  - Monitor SpO2 goal >92%  - Incentive spirometry   - tolerating CPAP, repeat ABG this PM  - daily ABG, CXR    CARDIOVASCULAR   - Monitor hemodynamics, MAP >65  - nimodipine 30 q8 x 7 days, 60 q8 x12 weeks for vocal paralysis per ENT       GASTROINTESTINAL   - Diet: NPO  - KO feeding tube   - protonix       /RENAL   - IV fluids: LR @ 75cc/hr  - Strict I/Os  - Monitor BMP qd  - Maintain cotton catheter, strict Is/Os  - Monitor electrolytes, replete PRN    HEMATOLOGIC  - Monitor H/H   - DVT ppx: Lovenox  & SCD's    INFECTIOUS DISEASE  - Monitor fever / WBC    ENDOCRINE  - Monitor gluc q6   - ISS   - decadron 10 q 6hr   - 137 mcg synthroid     LINES  - Cotton ( 8 / 22 )  - PIV   - YOLETTE drains x 2 (right / left)    DISPO: SICU  --------------------------------------------------------------------------------------    Critical Care Diagnoses: vocal cord paralysis requiring mechanical ventilation

## 2022-08-24 LAB
ANION GAP SERPL CALC-SCNC: 12 MMOL/L — SIGNIFICANT CHANGE UP (ref 7–14)
BLOOD GAS ARTERIAL - LYTES,HGB,ICA,LACT RESULT: SIGNIFICANT CHANGE UP
BUN SERPL-MCNC: 16 MG/DL — SIGNIFICANT CHANGE UP (ref 7–23)
CALCIUM SERPL-MCNC: 8.5 MG/DL — SIGNIFICANT CHANGE UP (ref 8.4–10.5)
CHLORIDE SERPL-SCNC: 105 MMOL/L — SIGNIFICANT CHANGE UP (ref 98–107)
CO2 SERPL-SCNC: 19 MMOL/L — LOW (ref 22–31)
CREAT SERPL-MCNC: 0.75 MG/DL — SIGNIFICANT CHANGE UP (ref 0.5–1.3)
EGFR: 107 ML/MIN/1.73M2 — SIGNIFICANT CHANGE UP
GLUCOSE BLDC GLUCOMTR-MCNC: 188 MG/DL — HIGH (ref 70–99)
GLUCOSE BLDC GLUCOMTR-MCNC: 219 MG/DL — HIGH (ref 70–99)
GLUCOSE BLDC GLUCOMTR-MCNC: 245 MG/DL — HIGH (ref 70–99)
GLUCOSE BLDC GLUCOMTR-MCNC: 250 MG/DL — HIGH (ref 70–99)
GLUCOSE BLDC GLUCOMTR-MCNC: 251 MG/DL — HIGH (ref 70–99)
GLUCOSE BLDC GLUCOMTR-MCNC: 257 MG/DL — HIGH (ref 70–99)
GLUCOSE SERPL-MCNC: 294 MG/DL — HIGH (ref 70–99)
HCT VFR BLD CALC: 39.6 % — SIGNIFICANT CHANGE UP (ref 39–50)
HGB BLD-MCNC: 13.5 G/DL — SIGNIFICANT CHANGE UP (ref 13–17)
MAGNESIUM SERPL-MCNC: 1.9 MG/DL — SIGNIFICANT CHANGE UP (ref 1.6–2.6)
MCHC RBC-ENTMCNC: 30.1 PG — SIGNIFICANT CHANGE UP (ref 27–34)
MCHC RBC-ENTMCNC: 34.1 GM/DL — SIGNIFICANT CHANGE UP (ref 32–36)
MCV RBC AUTO: 88.2 FL — SIGNIFICANT CHANGE UP (ref 80–100)
NRBC # BLD: 0 /100 WBCS — SIGNIFICANT CHANGE UP (ref 0–0)
NRBC # FLD: 0 K/UL — SIGNIFICANT CHANGE UP (ref 0–0)
PHOSPHATE SERPL-MCNC: 3 MG/DL — SIGNIFICANT CHANGE UP (ref 2.5–4.5)
PLATELET # BLD AUTO: 208 K/UL — SIGNIFICANT CHANGE UP (ref 150–400)
POTASSIUM SERPL-MCNC: 3.9 MMOL/L — SIGNIFICANT CHANGE UP (ref 3.5–5.3)
POTASSIUM SERPL-SCNC: 3.9 MMOL/L — SIGNIFICANT CHANGE UP (ref 3.5–5.3)
RBC # BLD: 4.49 M/UL — SIGNIFICANT CHANGE UP (ref 4.2–5.8)
RBC # FLD: 13.3 % — SIGNIFICANT CHANGE UP (ref 10.3–14.5)
SODIUM SERPL-SCNC: 136 MMOL/L — SIGNIFICANT CHANGE UP (ref 135–145)
WBC # BLD: 11.32 K/UL — HIGH (ref 3.8–10.5)
WBC # FLD AUTO: 11.32 K/UL — HIGH (ref 3.8–10.5)

## 2022-08-24 PROCEDURE — 99291 CRITICAL CARE FIRST HOUR: CPT | Mod: 25

## 2022-08-24 RX ORDER — OXYCODONE HYDROCHLORIDE 5 MG/1
10 TABLET ORAL EVERY 4 HOURS
Refills: 0 | Status: DISCONTINUED | OUTPATIENT
Start: 2022-08-24 | End: 2022-08-25

## 2022-08-24 RX ORDER — SUCCINYLCHOLINE CHLORIDE 100 MG/5ML
100 SYRINGE (ML) INTRAVENOUS ONCE
Refills: 0 | Status: DISCONTINUED | OUTPATIENT
Start: 2022-08-24 | End: 2022-08-24

## 2022-08-24 RX ORDER — PROPOFOL 10 MG/ML
100 INJECTION, EMULSION INTRAVENOUS ONCE
Refills: 0 | Status: DISCONTINUED | OUTPATIENT
Start: 2022-08-24 | End: 2022-08-24

## 2022-08-24 RX ORDER — SODIUM CHLORIDE 9 MG/ML
1000 INJECTION, SOLUTION INTRAVENOUS
Refills: 0 | Status: DISCONTINUED | OUTPATIENT
Start: 2022-08-24 | End: 2022-08-25

## 2022-08-24 RX ORDER — INSULIN GLARGINE 100 [IU]/ML
11 INJECTION, SOLUTION SUBCUTANEOUS AT BEDTIME
Refills: 0 | Status: DISCONTINUED | OUTPATIENT
Start: 2022-08-24 | End: 2022-08-24

## 2022-08-24 RX ORDER — INSULIN GLARGINE 100 [IU]/ML
11 INJECTION, SOLUTION SUBCUTANEOUS EVERY MORNING
Refills: 0 | Status: DISCONTINUED | OUTPATIENT
Start: 2022-08-24 | End: 2022-08-24

## 2022-08-24 RX ORDER — OXYCODONE HYDROCHLORIDE 5 MG/1
5 TABLET ORAL EVERY 4 HOURS
Refills: 0 | Status: DISCONTINUED | OUTPATIENT
Start: 2022-08-24 | End: 2022-08-25

## 2022-08-24 RX ADMIN — Medication 975 MILLIGRAM(S): at 19:00

## 2022-08-24 RX ADMIN — Medication 975 MILLIGRAM(S): at 05:52

## 2022-08-24 RX ADMIN — DEXMEDETOMIDINE HYDROCHLORIDE IN 0.9% SODIUM CHLORIDE 4.95 MICROGRAM(S)/KG/HR: 4 INJECTION INTRAVENOUS at 07:55

## 2022-08-24 RX ADMIN — Medication 102 MILLIGRAM(S): at 05:54

## 2022-08-24 RX ADMIN — Medication 975 MILLIGRAM(S): at 11:53

## 2022-08-24 RX ADMIN — CHLORHEXIDINE GLUCONATE 15 MILLILITER(S): 213 SOLUTION TOPICAL at 05:54

## 2022-08-24 RX ADMIN — Medication 4: at 17:46

## 2022-08-24 RX ADMIN — Medication 975 MILLIGRAM(S): at 00:23

## 2022-08-24 RX ADMIN — ENOXAPARIN SODIUM 40 MILLIGRAM(S): 100 INJECTION SUBCUTANEOUS at 05:54

## 2022-08-24 RX ADMIN — Medication 4: at 11:53

## 2022-08-24 RX ADMIN — SODIUM CHLORIDE 75 MILLILITER(S): 9 INJECTION, SOLUTION INTRAVENOUS at 10:06

## 2022-08-24 RX ADMIN — Medication 2: at 23:17

## 2022-08-24 RX ADMIN — INSULIN GLARGINE 11 UNIT(S): 100 INJECTION, SOLUTION SUBCUTANEOUS at 08:01

## 2022-08-24 RX ADMIN — Medication 975 MILLIGRAM(S): at 12:50

## 2022-08-24 RX ADMIN — PANTOPRAZOLE SODIUM 40 MILLIGRAM(S): 20 TABLET, DELAYED RELEASE ORAL at 11:53

## 2022-08-24 RX ADMIN — NIMODIPINE 30 MILLIGRAM(S): 60 SOLUTION ORAL at 07:55

## 2022-08-24 RX ADMIN — SODIUM CHLORIDE 75 MILLILITER(S): 9 INJECTION, SOLUTION INTRAVENOUS at 07:55

## 2022-08-24 RX ADMIN — NIMODIPINE 30 MILLIGRAM(S): 60 SOLUTION ORAL at 00:23

## 2022-08-24 RX ADMIN — Medication 975 MILLIGRAM(S): at 23:17

## 2022-08-24 RX ADMIN — Medication 975 MILLIGRAM(S): at 17:46

## 2022-08-24 RX ADMIN — Medication 6: at 00:24

## 2022-08-24 RX ADMIN — Medication 102 MILLIGRAM(S): at 00:23

## 2022-08-24 RX ADMIN — NIMODIPINE 30 MILLIGRAM(S): 60 SOLUTION ORAL at 15:51

## 2022-08-24 RX ADMIN — NIMODIPINE 30 MILLIGRAM(S): 60 SOLUTION ORAL at 23:18

## 2022-08-24 RX ADMIN — Medication 137 MICROGRAM(S): at 05:54

## 2022-08-24 RX ADMIN — SODIUM CHLORIDE 75 MILLILITER(S): 9 INJECTION, SOLUTION INTRAVENOUS at 20:25

## 2022-08-24 RX ADMIN — Medication 4: at 05:52

## 2022-08-24 NOTE — DIETITIAN INITIAL EVALUATION ADULT - PERTINENT MEDS FT
MEDICATIONS  (STANDING):  acetaminophen    Suspension .. 975 milliGRAM(s) Oral every 6 hours  dextrose 5% + sodium chloride 0.45%. 1000 milliLiter(s) (75 mL/Hr) IV Continuous <Continuous>  enoxaparin Injectable 40 milliGRAM(s) SubCutaneous every 24 hours  glucagon  Injectable 1 milliGRAM(s) IntraMuscular once  insulin glargine Injectable (LANTUS) 11 Unit(s) SubCutaneous every morning  insulin lispro (ADMELOG) corrective regimen sliding scale   SubCutaneous every 6 hours  levothyroxine 137 MICROGram(s) Oral daily  niMODipine Oral Solution 30 milliGRAM(s) Enteral Tube <User Schedule>  pantoprazole   Suspension 40 milliGRAM(s) Oral daily    MEDICATIONS  (PRN):  oxyCODONE    Solution 5 milliGRAM(s) Oral every 4 hours PRN Moderate Pain (4 - 6)  oxyCODONE    Solution 10 milliGRAM(s) Oral every 4 hours PRN Severe Pain (7 - 10)

## 2022-08-24 NOTE — DIETITIAN INITIAL EVALUATION ADULT - OTHER INFO
53 y/o male with PMH type 2 DM, HTN, hypothyroidism, papillary thyroid cancer and dyslipidemia who presented to [facility]  for scheduled left thyroidectomy with limited neck dissection, right modified radical neck dissection; now POD2. Intraop course was uneventful however post op course complicated by stridor, immediate laryngoscopy significant for vocal cord paralysis.  Reintubated without incident and transferred to ICU for further management. Patient transferred to [facility] for extubation under supervision by ENT. Pt extubated, on 100% O2. He was very groggy and continued to fall asleep during interview. Spoke with Team who reported that pt will remain NPO as he may require going back to OR. Receiving IVF of D5W + NS 0.45% with IVPB fluids. No GI distress at present; no BMs this admission. Pt with hx DM; FS over past 24 hrs 194 - 280 mg/dl with Lantus and Admelog insulins ordered for coverage. If pt to require TF recs, please consult this service. RDN services to remain available as needed.

## 2022-08-24 NOTE — CHART NOTE - NSCHARTNOTEFT_GEN_A_CORE
Consulted by SICU attending.     Pt is a 52 year old male with PMH type 2 DM, HTN, hypothyroidism, papillary thyroid cancer and dyslipidemia who presented to [facility]  for scheduled left thyroidectomy with limited neck dissection, right modified radical neck dissection; now POD2. Intraop course was uneventful however post op course complicated by stridor, immediate laryngoscopy significant for vocal cord paralysis.  Reintubated without incident and transferred to ICU for further management. Patient transferred to [facility] for extubation under supervision by ENT. Pt remains intubated currently.   Pt seen at bedside for trial of extubation with second anesthesia provider. Pt awake, alert and following all commands. ENT attending, and resident also at bedside.    Difficult airway equipment readily available.   Pt extubated, on 100% O2. Tolerating face tent, scoped by ENT. Tolerating extubation, no stridor appreciated.   Pt will continue to receive decadron and nimodipine for paresis. Requires ICU level monitoring.     Anesthesia team aware of patient, can be called for airway assistance. Will continue to follow.

## 2022-08-24 NOTE — PROGRESS NOTE ADULT - SUBJECTIVE AND OBJECTIVE BOX
24 HOUR EVENTS:  - d/c fentanyl, propofol -> precedex  - plan for extubation Wednesday  - ENT to scope tomorrow  - CPAP 5/5  - f/u repeat ABG  - IVF decreased to LR75cc/hr    SUBJECTIVE/ROS:  Patient seen at bedside this AM.     OBJECTIVE:    VITALS:  Vital Signs Last 24 Hrs  T(C): 36.6 (24 Aug 2022 00:00), Max: 36.6 (23 Aug 2022 16:00)  T(F): 97.8 (24 Aug 2022 00:00), Max: 97.8 (23 Aug 2022 16:00)  HR: 79 (24 Aug 2022 01:00) (65 - 86)  BP: 112/75 (24 Aug 2022 01:00) (96/63 - 135/89)  BP(mean): 87 (24 Aug 2022 01:00) (73 - 103)  RR: 22 (24 Aug 2022 01:00) (10 - 24)  SpO2: 96% (24 Aug 2022 01:00) (93% - 100%)    Parameters below as of 24 Aug 2022 00:00  Patient On (Oxygen Delivery Method): ventilator,CPAP 5/5     O2 Concentration (%): 21  Mode: CPAP with PS  FiO2: 21  PEEP: 5  PS: 5  MAP: 8  PIP: 12    I&O's Summary    22 Aug 2022 07:01  -  23 Aug 2022 07:00  --------------------------------------------------------  IN: 2047.8 mL / OUT: 1935 mL / NET: 112.8 mL    23 Aug 2022 07:01  -  24 Aug 2022 02:09  --------------------------------------------------------  IN: 2001.2 mL / OUT: 2985 mL / NET: -983.8 mL        PHYSICAL EXAM:    NEURO  RASS:     GCS:     CAM ICU:  Exam: awake, alert, oriented    RESPIRATORY  Exam: no increased WOB on RA  Mechanical Ventilation: Mode: CPAP with PS, RR (patient): 18, FiO2: 21, PEEP: 5, PS: 5, MAP: 8, PIP: 12    CARDIOVASCULAR  Exam: warm, well-perfused  Cardiac Rhythm: normal sinus rhythm noted on cardiac monitor    GI/NUTRITION  Exam: soft, non-distended, non-tender    GENITOURINARY  Exam:     ACCESS DEVICES:  [ ] Peripheral IV  [ ] Central Venous Line	[ ] R	[ ] L	[ ] IJ	[ ] Fem	[ ] SC	Placed:   [ ] Arterial Line		[ ] R	[ ] L	[ ] Fem	[ ] Rad	[ ] Ax	Placed:   [ ] PICC:					[ ] Mediport  [ ] Urinary Catheter, Date Placed:   [x] Necessity of urinary, arterial, and venous catheters discussed      LABS:                        13.5   11.32 )-----------( 208      ( 24 Aug 2022 01:07 )             39.6   08-24    136  |  105  |  16  ----------------------------<  294<H>  3.9   |  19<L>  |  0.75    Ca    8.5      24 Aug 2022 01:07  Phos  3.0     08-24  Mg     1.90     08-24      CAPILLARY BLOOD GLUCOSE      POCT Blood Glucose.: 257 mg/dL (24 Aug 2022 00:22)  POCT Blood Glucose.: 280 mg/dL (23 Aug 2022 18:04)  POCT Blood Glucose.: 232 mg/dL (23 Aug 2022 12:22)  POCT Blood Glucose.: 194 mg/dL (23 Aug 2022 05:30)      MEDICATIONS:   MEDICATIONS  (STANDING):  acetaminophen    Suspension .. 975 milliGRAM(s) Oral every 6 hours  chlorhexidine 0.12% Liquid 15 milliLiter(s) Oral Mucosa every 12 hours  dexAMETHasone  IVPB 10 milliGRAM(s) IV Intermittent every 6 hours  dexMEDEtomidine Infusion 0.2 MICROgram(s)/kG/Hr (4.95 mL/Hr) IV Continuous <Continuous>  enoxaparin Injectable 40 milliGRAM(s) SubCutaneous every 24 hours  glucagon  Injectable 1 milliGRAM(s) IntraMuscular once  insulin lispro (ADMELOG) corrective regimen sliding scale   SubCutaneous every 6 hours  lactated ringers. 1000 milliLiter(s) (75 mL/Hr) IV Continuous <Continuous>  levothyroxine 137 MICROGram(s) Oral daily  niMODipine Oral Solution 30 milliGRAM(s) Enteral Tube <User Schedule>  pantoprazole   Suspension 40 milliGRAM(s) Oral daily    MEDICATIONS  (PRN):  HYDROmorphone  Injectable 0.5 milliGRAM(s) IV Push every 4 hours PRN Moderate Pain (4 - 6)  HYDROmorphone  Injectable 1 milliGRAM(s) IV Push every 4 hours PRN Severe Pain (7 - 10)      IMAGING: 24 HOUR EVENTS:  - d/c fentanyl, propofol -> precedex  - plan for extubation Wednesday  - ENT to scope tomorrow  - CPAP 5/5  - f/u repeat ABG  - IVF decreased to LR75cc/hr    SUBJECTIVE/ROS:  Patient seen at bedside this AM.     OBJECTIVE:    VITALS:  Vital Signs Last 24 Hrs  T(C): 36.6 (24 Aug 2022 00:00), Max: 36.6 (23 Aug 2022 16:00)  T(F): 97.8 (24 Aug 2022 00:00), Max: 97.8 (23 Aug 2022 16:00)  HR: 79 (24 Aug 2022 01:00) (65 - 86)  BP: 112/75 (24 Aug 2022 01:00) (96/63 - 135/89)  BP(mean): 87 (24 Aug 2022 01:00) (73 - 103)  RR: 22 (24 Aug 2022 01:00) (10 - 24)  SpO2: 96% (24 Aug 2022 01:00) (93% - 100%)    Parameters below as of 24 Aug 2022 00:00  Patient On (Oxygen Delivery Method): ventilator,CPAP 5/5     O2 Concentration (%): 21  Mode: CPAP with PS  FiO2: 21  PEEP: 5  PS: 5  MAP: 8  PIP: 12    I&O's Summary    22 Aug 2022 07:01  -  23 Aug 2022 07:00  --------------------------------------------------------  IN: 2047.8 mL / OUT: 1935 mL / NET: 112.8 mL    23 Aug 2022 07:01  -  24 Aug 2022 02:09  --------------------------------------------------------  IN: 2001.2 mL / OUT: 2985 mL / NET: -983.8 mL        PHYSICAL EXAM:    NEURO  RASS: -1  Exam: awake, alert, oriented    RESPIRATORY  Exam: no increased WOB on RA  Mechanical Ventilation: Mode: CPAP with PS, RR (patient): 18, FiO2: 21, PEEP: 5, PS: 5, MAP: 8, PIP: 12    CARDIOVASCULAR  Exam: warm, well-perfused  Cardiac Rhythm: normal sinus rhythm noted on cardiac monitor    GI/NUTRITION  Exam: soft, non-distended, non-tender    GENITOURINARY  Exam:     ACCESS DEVICES:  [ ] Peripheral IV  [ ] Central Venous Line	[ ] R	[ ] L	[ ] IJ	[ ] Fem	[ ] SC	Placed:   [ ] Arterial Line		[ ] R	[ ] L	[ ] Fem	[ ] Rad	[ ] Ax	Placed:   [ ] PICC:					[ ] Mediport  [ ] Urinary Catheter, Date Placed:   [x] Necessity of urinary, arterial, and venous catheters discussed      LABS:                        13.5   11.32 )-----------( 208      ( 24 Aug 2022 01:07 )             39.6   08-24    136  |  105  |  16  ----------------------------<  294<H>  3.9   |  19<L>  |  0.75    Ca    8.5      24 Aug 2022 01:07  Phos  3.0     08-24  Mg     1.90     08-24      CAPILLARY BLOOD GLUCOSE      POCT Blood Glucose.: 257 mg/dL (24 Aug 2022 00:22)  POCT Blood Glucose.: 280 mg/dL (23 Aug 2022 18:04)  POCT Blood Glucose.: 232 mg/dL (23 Aug 2022 12:22)  POCT Blood Glucose.: 194 mg/dL (23 Aug 2022 05:30)      MEDICATIONS:   MEDICATIONS  (STANDING):  acetaminophen    Suspension .. 975 milliGRAM(s) Oral every 6 hours  chlorhexidine 0.12% Liquid 15 milliLiter(s) Oral Mucosa every 12 hours  dexAMETHasone  IVPB 10 milliGRAM(s) IV Intermittent every 6 hours  dexMEDEtomidine Infusion 0.2 MICROgram(s)/kG/Hr (4.95 mL/Hr) IV Continuous <Continuous>  enoxaparin Injectable 40 milliGRAM(s) SubCutaneous every 24 hours  glucagon  Injectable 1 milliGRAM(s) IntraMuscular once  insulin lispro (ADMELOG) corrective regimen sliding scale   SubCutaneous every 6 hours  lactated ringers. 1000 milliLiter(s) (75 mL/Hr) IV Continuous <Continuous>  levothyroxine 137 MICROGram(s) Oral daily  niMODipine Oral Solution 30 milliGRAM(s) Enteral Tube <User Schedule>  pantoprazole   Suspension 40 milliGRAM(s) Oral daily    MEDICATIONS  (PRN):  HYDROmorphone  Injectable 0.5 milliGRAM(s) IV Push every 4 hours PRN Moderate Pain (4 - 6)  HYDROmorphone  Injectable 1 milliGRAM(s) IV Push every 4 hours PRN Severe Pain (7 - 10)      IMAGING:

## 2022-08-24 NOTE — PROGRESS NOTE ADULT - ASSESSMENT
ASSESSMENT:  52 year old male with PMH type 2 DM, HTN, hypothyroidism, papillary thyroid cancer and dyslipidemia s/p total thyroidectomy with right modified neck dissection c/b stridor post extubation and found to have vocal cord paralysis on DL. Patient was reintubated and transferred to [facility] for extubation under supervision.     NEUROLOGIC   - intubated and sedated: on precedex drip, wean propofol  - pain control: IV tylenol, dilaudid PRN     RESPIRATORY   - air leak 90%; plan for extubation with ENT bedside tomorrow, plan for repeat scope today  - Monitor SpO2 goal >92%  - Incentive spirometry   - tolerating CPAP, repeat ABG this PM  - daily ABG, CXR    CARDIOVASCULAR   - Monitor hemodynamics, MAP >65  - nimodipine 30 q8 x 7 days, 60 q8 x12 weeks for vocal paralysis per ENT       GASTROINTESTINAL   - Diet: NPO  - KO feeding tube   - protonix       /RENAL   - IV fluids: LR @ 75cc/hr  - Strict I/Os  - Monitor BMP qd  - Maintain cotton catheter, strict Is/Os  - Monitor electrolytes, replete PRN    HEMATOLOGIC  - Monitor H/H   - DVT ppx: Lovenox  & SCD's    INFECTIOUS DISEASE  - Monitor fever / WBC    ENDOCRINE  - Monitor gluc q6   - ISS   - decadron 10 q 6hr   - 137 mcg synthroid     LINES  - Cotton ( 8 / 22 )  - PIV   - YOLETTE drains x 2 (right / left)    DISPO: SICU  --------------------------------------------------------------------------------------    Critical Care Diagnoses: vocal cord paralysis requiring mechanical ventilation  ASSESSMENT:  52 year old male with PMH type 2 DM, HTN, hypothyroidism, papillary thyroid cancer and dyslipidemia s/p total thyroidectomy with right modified neck dissection c/b stridor post extubation and found to have vocal cord paralysis on DL. Patient was reintubated and transferred to [facility] for extubation under supervision.     PLAN:   NEUROLOGIC   - awake, reading a book   - pain control: tylenol suspension, oxycodone PRN     RESPIRATORY   - Pt extubated at bedside this morning with ENT   - On face tent   - Monitor SpO2 goal >92%    CARDIOVASCULAR   - Monitor hemodynamics, MAP >65  - nimodipine 30 q8 x 7 days, 60 q8 x12 weeks for vocal paralysis per ENT     GASTROINTESTINAL   - Diet: NPO/ KO feeding tube   - protonix     /RENAL   - IV fluids: LR @ 75cc/hr  - Strict I/Os  - Monitor BMP qd  - D/c Lopez TOV   - Monitor electrolytes, replete PRN    HEMATOLOGIC  - Monitor H/H   - DVT ppx: Lovenox & SCD's    INFECTIOUS DISEASE  - Monitor fever / WBC    ENDOCRINE  - D/c decadron   - Monitor gluc q6   - Lantus 11U/ ISS   - 137 mcg synthroid     LINES  - Lopez (8/22) -> d/c today   - PIV   - YOLETTE drains x 2 (right / left)    DISPO:   - SICU  --------------------------------------------------------------------------------------    Critical Care Diagnoses: vocal cord paralysis requiring mechanical ventilation

## 2022-08-24 NOTE — DIETITIAN INITIAL EVALUATION ADULT - PATIENT MEETS CRITERIA FOR MALNUTRITION
Pt c/o abdominal cramping and vaginal bleeding since Monday. Pt states she already had her menstrual cycle on the 2nd of September. Denies nausea or vomiting. Denies vaginal discharge, dysuria or hematuria.   no

## 2022-08-24 NOTE — DIETITIAN INITIAL EVALUATION ADULT - PERTINENT LABORATORY DATA
08-24    136  |  105  |  16  ----------------------------<  294<H>  3.9   |  19<L>  |  0.75    Ca    8.5      24 Aug 2022 01:07  Phos  3.0     08-24  Mg     1.90     08-24    POCT Blood Glucose.: 219 mg/dL (08-24-22 @ 11:52)  A1C with Estimated Average Glucose Result: 7.6 % (08-22-22 @ 21:30)

## 2022-08-24 NOTE — PROGRESS NOTE ADULT - SUBJECTIVE AND OBJECTIVE BOX
· Subjective and Objective:   Patient seen and examined at bedside. Patient intubated.     ICU Vital Signs Last 24 Hrs  T(C): 36.7 (24 Aug 2022 04:00), Max: 36.7 (24 Aug 2022 04:00)  T(F): 98.1 (24 Aug 2022 04:00), Max: 98.1 (24 Aug 2022 04:00)  HR: 80 (24 Aug 2022 07:00) (70 - 86)  BP: 113/77 (24 Aug 2022 07:00) (110/73 - 135/89)  BP(mean): 87 (24 Aug 2022 07:00) (84 - 103)  ABP: --  ABP(mean): --  RR: 14 (24 Aug 2022 07:00) (10 - 22)  SpO2: 96% (24 Aug 2022 07:00) (93% - 98%)    O2 Parameters below as of 24 Aug 2022 04:00  Patient On (Oxygen Delivery Method): ventilator,CPAP 5/5    O2 Concentration (%): 21      General: Sedated on vent  AD: Pinna wnl, EAC clear  AS: Pinna wnl, EAC clear  Nose: nasal cavity clear bilaterally, inferior turbinates normal, mucosa normal without crusting or bleeding  OC/OP: tongue normal, floor of mouth wnl, no masses or lesions, OP clear  Neck: neck incision c/d/i, R neck YOLETTE with dark sanguinous output      Assessment and Recommendation:   · Assessment	  Assessment/Plan:  51yo M hx with papillary thyroid cancer as/p total thyroidectomy, central neck dissection on 8/22, c/b b/l TVC paresis, intubated 8/22.  - nimodipine 30 mg q8 x1 week then 60 mg q8 x 12 weeks for vocal cord paresis  - Continue decadron 10mg q6 and PPI and IV synthroid  - Will d/w Dr. Billy extubation plan  - Records Is and Os  - ENT to follow

## 2022-08-24 NOTE — DIETITIAN INITIAL EVALUATION ADULT - ADD RECOMMEND
1. Nutrition plan of care as per medical team. 2. Monitor weights, labs, BM's, skin integrity. 3. Follow pt as per protocol.

## 2022-08-25 ENCOUNTER — APPOINTMENT (OUTPATIENT)
Dept: OTOLARYNGOLOGY | Facility: HOSPITAL | Age: 52
End: 2022-08-25

## 2022-08-25 ENCOUNTER — TRANSCRIPTION ENCOUNTER (OUTPATIENT)
Age: 52
End: 2022-08-25

## 2022-08-25 LAB
ANION GAP SERPL CALC-SCNC: 10 MMOL/L — SIGNIFICANT CHANGE UP (ref 7–14)
BUN SERPL-MCNC: 15 MG/DL — SIGNIFICANT CHANGE UP (ref 7–23)
CALCIUM SERPL-MCNC: 8.5 MG/DL — SIGNIFICANT CHANGE UP (ref 8.4–10.5)
CHLORIDE SERPL-SCNC: 106 MMOL/L — SIGNIFICANT CHANGE UP (ref 98–107)
CO2 SERPL-SCNC: 22 MMOL/L — SIGNIFICANT CHANGE UP (ref 22–31)
CREAT SERPL-MCNC: 0.78 MG/DL — SIGNIFICANT CHANGE UP (ref 0.5–1.3)
EGFR: 106 ML/MIN/1.73M2 — SIGNIFICANT CHANGE UP
GLUCOSE BLDC GLUCOMTR-MCNC: 191 MG/DL — HIGH (ref 70–99)
GLUCOSE BLDC GLUCOMTR-MCNC: 210 MG/DL — HIGH (ref 70–99)
GLUCOSE BLDC GLUCOMTR-MCNC: 247 MG/DL — HIGH (ref 70–99)
GLUCOSE SERPL-MCNC: 234 MG/DL — HIGH (ref 70–99)
HCT VFR BLD CALC: 42.6 % — SIGNIFICANT CHANGE UP (ref 39–50)
HGB BLD-MCNC: 14.6 G/DL — SIGNIFICANT CHANGE UP (ref 13–17)
MAGNESIUM SERPL-MCNC: 1.7 MG/DL — SIGNIFICANT CHANGE UP (ref 1.6–2.6)
MCHC RBC-ENTMCNC: 30.4 PG — SIGNIFICANT CHANGE UP (ref 27–34)
MCHC RBC-ENTMCNC: 34.3 GM/DL — SIGNIFICANT CHANGE UP (ref 32–36)
MCV RBC AUTO: 88.6 FL — SIGNIFICANT CHANGE UP (ref 80–100)
NRBC # BLD: 0 /100 WBCS — SIGNIFICANT CHANGE UP (ref 0–0)
NRBC # FLD: 0 K/UL — SIGNIFICANT CHANGE UP (ref 0–0)
PHOSPHATE SERPL-MCNC: 2.1 MG/DL — LOW (ref 2.5–4.5)
PLATELET # BLD AUTO: 233 K/UL — SIGNIFICANT CHANGE UP (ref 150–400)
POTASSIUM SERPL-MCNC: 3.3 MMOL/L — LOW (ref 3.5–5.3)
POTASSIUM SERPL-SCNC: 3.3 MMOL/L — LOW (ref 3.5–5.3)
RBC # BLD: 4.81 M/UL — SIGNIFICANT CHANGE UP (ref 4.2–5.8)
RBC # FLD: 13.2 % — SIGNIFICANT CHANGE UP (ref 10.3–14.5)
SODIUM SERPL-SCNC: 138 MMOL/L — SIGNIFICANT CHANGE UP (ref 135–145)
SURGICAL PATHOLOGY STUDY: SIGNIFICANT CHANGE UP
WBC # BLD: 13.78 K/UL — HIGH (ref 3.8–10.5)
WBC # FLD AUTO: 13.78 K/UL — HIGH (ref 3.8–10.5)

## 2022-08-25 PROCEDURE — 99232 SBSQ HOSP IP/OBS MODERATE 35: CPT | Mod: 25

## 2022-08-25 RX ORDER — INSULIN LISPRO 100/ML
VIAL (ML) SUBCUTANEOUS
Refills: 0 | Status: DISCONTINUED | OUTPATIENT
Start: 2022-08-25 | End: 2022-08-27

## 2022-08-25 RX ORDER — LANOLIN ALCOHOL/MO/W.PET/CERES
3 CREAM (GRAM) TOPICAL AT BEDTIME
Refills: 0 | Status: DISCONTINUED | OUTPATIENT
Start: 2022-08-25 | End: 2022-08-27

## 2022-08-25 RX ORDER — INSULIN GLARGINE 100 [IU]/ML
10 INJECTION, SOLUTION SUBCUTANEOUS AT BEDTIME
Refills: 0 | Status: DISCONTINUED | OUTPATIENT
Start: 2022-08-25 | End: 2022-08-27

## 2022-08-25 RX ORDER — MAGNESIUM SULFATE 500 MG/ML
2 VIAL (ML) INJECTION ONCE
Refills: 0 | Status: COMPLETED | OUTPATIENT
Start: 2022-08-25 | End: 2022-08-25

## 2022-08-25 RX ORDER — OXYCODONE HYDROCHLORIDE 5 MG/1
5 TABLET ORAL EVERY 4 HOURS
Refills: 0 | Status: DISCONTINUED | OUTPATIENT
Start: 2022-08-25 | End: 2022-08-27

## 2022-08-25 RX ORDER — OXYCODONE HYDROCHLORIDE 5 MG/1
10 TABLET ORAL EVERY 4 HOURS
Refills: 0 | Status: DISCONTINUED | OUTPATIENT
Start: 2022-08-25 | End: 2022-08-27

## 2022-08-25 RX ORDER — POTASSIUM CHLORIDE 20 MEQ
20 PACKET (EA) ORAL ONCE
Refills: 0 | Status: COMPLETED | OUTPATIENT
Start: 2022-08-25 | End: 2022-08-25

## 2022-08-25 RX ORDER — NIMODIPINE 60 MG/10ML
30 SOLUTION ORAL
Refills: 0 | Status: DISCONTINUED | OUTPATIENT
Start: 2022-08-25 | End: 2022-08-27

## 2022-08-25 RX ORDER — ACETAMINOPHEN 500 MG
1000 TABLET ORAL EVERY 6 HOURS
Refills: 0 | Status: DISCONTINUED | OUTPATIENT
Start: 2022-08-25 | End: 2022-08-27

## 2022-08-25 RX ORDER — POTASSIUM CHLORIDE 20 MEQ
40 PACKET (EA) ORAL ONCE
Refills: 0 | Status: DISCONTINUED | OUTPATIENT
Start: 2022-08-25 | End: 2022-08-25

## 2022-08-25 RX ORDER — POTASSIUM CHLORIDE 20 MEQ
20 PACKET (EA) ORAL ONCE
Refills: 0 | Status: DISCONTINUED | OUTPATIENT
Start: 2022-08-25 | End: 2022-08-25

## 2022-08-25 RX ORDER — INSULIN LISPRO 100/ML
VIAL (ML) SUBCUTANEOUS
Refills: 0 | Status: DISCONTINUED | OUTPATIENT
Start: 2022-08-25 | End: 2022-08-25

## 2022-08-25 RX ORDER — PANTOPRAZOLE SODIUM 20 MG/1
40 TABLET, DELAYED RELEASE ORAL
Refills: 0 | Status: DISCONTINUED | OUTPATIENT
Start: 2022-08-25 | End: 2022-08-27

## 2022-08-25 RX ORDER — LEVOTHYROXINE SODIUM 125 MCG
137 TABLET ORAL DAILY
Refills: 0 | Status: DISCONTINUED | OUTPATIENT
Start: 2022-08-25 | End: 2022-08-27

## 2022-08-25 RX ORDER — ACETAMINOPHEN 500 MG
2 TABLET ORAL
Qty: 0 | Refills: 0 | DISCHARGE
Start: 2022-08-25

## 2022-08-25 RX ORDER — POTASSIUM CHLORIDE 20 MEQ
40 PACKET (EA) ORAL ONCE
Refills: 0 | Status: COMPLETED | OUTPATIENT
Start: 2022-08-25 | End: 2022-08-25

## 2022-08-25 RX ADMIN — NIMODIPINE 30 MILLIGRAM(S): 60 SOLUTION ORAL at 08:00

## 2022-08-25 RX ADMIN — Medication 4: at 12:13

## 2022-08-25 RX ADMIN — Medication 3 MILLIGRAM(S): at 23:01

## 2022-08-25 RX ADMIN — Medication 2: at 23:00

## 2022-08-25 RX ADMIN — Medication 20 MILLIEQUIVALENT(S): at 02:40

## 2022-08-25 RX ADMIN — NIMODIPINE 30 MILLIGRAM(S): 60 SOLUTION ORAL at 23:02

## 2022-08-25 RX ADMIN — NIMODIPINE 30 MILLIGRAM(S): 60 SOLUTION ORAL at 15:52

## 2022-08-25 RX ADMIN — Medication 137 MICROGRAM(S): at 18:40

## 2022-08-25 RX ADMIN — SODIUM CHLORIDE 75 MILLILITER(S): 9 INJECTION, SOLUTION INTRAVENOUS at 08:00

## 2022-08-25 RX ADMIN — Medication 975 MILLIGRAM(S): at 13:15

## 2022-08-25 RX ADMIN — Medication 975 MILLIGRAM(S): at 00:00

## 2022-08-25 RX ADMIN — INSULIN GLARGINE 10 UNIT(S): 100 INJECTION, SOLUTION SUBCUTANEOUS at 23:00

## 2022-08-25 RX ADMIN — Medication 137 MICROGRAM(S): at 06:49

## 2022-08-25 RX ADMIN — Medication 40 MILLIEQUIVALENT(S): at 02:40

## 2022-08-25 RX ADMIN — Medication 975 MILLIGRAM(S): at 12:12

## 2022-08-25 RX ADMIN — Medication 4: at 06:48

## 2022-08-25 RX ADMIN — ENOXAPARIN SODIUM 40 MILLIGRAM(S): 100 INJECTION SUBCUTANEOUS at 06:49

## 2022-08-25 RX ADMIN — Medication 975 MILLIGRAM(S): at 06:49

## 2022-08-25 RX ADMIN — PANTOPRAZOLE SODIUM 40 MILLIGRAM(S): 20 TABLET, DELAYED RELEASE ORAL at 12:13

## 2022-08-25 RX ADMIN — Medication 1000 MILLIGRAM(S): at 18:42

## 2022-08-25 RX ADMIN — Medication 25 GRAM(S): at 02:34

## 2022-08-25 NOTE — DISCHARGE NOTE PROVIDER - HOSPITAL COURSE
52y Male pmhx of DM II, HTN, hypothyroidism, papillary thyroid cancer transfer from [facility] underwent total thyroid and CND on 8/22 c/b b/l  TVF paresis.    Pt was intubated for TVF paresis and inability to protect airway. Pt was extubated 8/24 with ENT, SICU and aesthesia team at bedside. Pt was able to speak about 15 minutes after extubation. Pt remained under the care and in the SICU due to potential airway concerns. Pt seen by SLP on 8/25 and cleared for a regular diet with thin liquids.   Pt has two YOLETTE drains in place (right and left) neck. Labs were monitored and trended. ENT scoped and evaluated vocal cords since extubation         Patient is currently ambulating appropriately, voiding freely, tolerating diet and pain is well controlled. On day of discharge, patient deemed stable and ready to return home. 52y Male pmhx of DM II, HTN, hypothyroidism, papillary thyroid cancer transfer from [facility] underwent total thyroid and CND on 8/22 c/b b/l  TVF paresis.    Pt was intubated for TVF paresis and inability to protect airway. Pt was extubated 8/24 with ENT, SICU and aesthesia team at bedside. Pt was able to speak about 15 minutes after extubation. Pt remained under the care and in the SICU due to potential airway concerns. Pt seen by SLP on 8/25 and cleared for a regular diet with thin liquids.   Pt has two YOLETTE drains in place (right and left) neck. Labs were monitored and trended. ENT scoped and evaluated vocal cords since extubation. FOE revealed - glottic gap, limited ABduction, some ADduction.        Patient is currently ambulating appropriately, voiding freely, tolerating diet and pain is well controlled. On day of discharge, patient deemed stable and ready to return home.

## 2022-08-25 NOTE — SWALLOW BEDSIDE ASSESSMENT ADULT - ASR SWALLOW RECOMMEND DIAG
Objective testing NOT warranted given no overt signs of penetration/aspiration and CXR is 'Lungs are free of focal abnormalities"

## 2022-08-25 NOTE — DISCHARGE NOTE PROVIDER - NSDCFUADDAPPT_GEN_ALL_CORE_FT
ENT   - Please follow up outpatient call [phone]   - ENT office located at [address] [place] [address]

## 2022-08-25 NOTE — PHYSICAL THERAPY INITIAL EVALUATION ADULT - GAIT DISTANCE, PT EVAL
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 The subsequent recent TE note then stated that pt called herself to request an alternative medication. As long as her [employer]iki was not involved, it is up to her if she wants to keep taking valsartan or switch to something else. My preference is to switch all of my patients who are on valsartan to another alternative, regardless of Kiki, but it is her decision. bed to chair/5 feet

## 2022-08-25 NOTE — PHYSICAL THERAPY INITIAL EVALUATION ADULT - ADDITIONAL COMMENTS
Pt. was left seated in recliner chair post PT Evaluation, no apparent distress, all lines intact, call bell within reach. Adonay SOSA present.

## 2022-08-25 NOTE — PHYSICAL THERAPY INITIAL EVALUATION ADULT - PERTINENT HX OF CURRENT PROBLEM, REHAB EVAL
Pt. s/p total thyroidectomy, central neck dissection POD#3. Per documentation, complicated by bilateral TVC paresis, intubated 8/22 and extubated on 8/24.

## 2022-08-25 NOTE — DISCHARGE NOTE PROVIDER - NSDCMRMEDTOKEN_GEN_ALL_CORE_FT
chlorhexidine 0.12% mucous membrane liquid: 15 milliliter(s) mucous membrane every 12 hours  chlorhexidine 2% topical pad: Apply topically to affected area once a day  dexamethasone 10 mg/mL injectable solution: 10 milligram(s) intravenously every 6 hours   Lactated Ringers Injection intravenous solution: 75 milliliter(s) intravenous every hour  propofol: mcg/kg intravenous every hour   acetaminophen 500 mg oral tablet: 2 tab(s) orally every 6 hours  chlorhexidine 0.12% mucous membrane liquid: 15 milliliter(s) mucous membrane every 12 hours  chlorhexidine 2% topical pad: Apply topically to affected area once a day  dexamethasone 10 mg/mL injectable solution: 10 milligram(s) intravenously every 6 hours   levothyroxine 137 mcg (0.137 mg) oral tablet: 1 tab(s) orally once a day  melatonin 3 mg oral tablet: 1 tab(s) orally once a day (at bedtime)  niMODipine 30 mg/10 mL oral liquid: 10 milliliter(s) orally every 8 hours   niMODipine 60 mg/20 mL oral liquid: 20 milliliter(s) orally every 8 hours   pantoprazole 40 mg oral delayed release tablet: 1 tab(s) orally once a day (before a meal)   acetaminophen 500 mg oral tablet: 2 tab(s) orally every 6 hours  chlorhexidine 0.12% mucous membrane liquid: 15 milliliter(s) mucous membrane every 12 hours  chlorhexidine 2% topical pad: Apply topically to affected area once a day  dexamethasone 10 mg/mL injectable solution: 10 milligram(s) intravenously every 6 hours   levothyroxine 137 mcg (0.137 mg) oral tablet: 1 tab(s) orally once a day  melatonin 3 mg oral tablet: 1 tab(s) orally once a day (at bedtime)  niMODipine 30 mg/10 mL oral liquid: 10 milliliter(s) orally every 8 hours   pantoprazole 40 mg oral delayed release tablet: 1 tab(s) orally once a day (before a meal)   acetaminophen 500 mg oral tablet: 2 tab(s) orally every 6 hours  chlorhexidine 0.12% mucous membrane liquid: 15 milliliter(s) mucous membrane every 12 hours  dexamethasone 10 mg/mL injectable solution: 10 milligram(s) intravenously every 6 hours   levothyroxine 137 mcg (0.137 mg) oral tablet: 1 tab(s) orally once a day  melatonin 3 mg oral tablet: 1 tab(s) orally once a day (at bedtime)  niMODipine 30 mg/10 mL oral liquid: 10 milliliter(s) orally every 8 hours   pantoprazole 40 mg oral delayed release tablet: 1 tab(s) orally once a day (before a meal)   acetaminophen 500 mg oral tablet: 2 tab(s) orally every 6 hours  chlorhexidine 0.12% mucous membrane liquid: 15 milliliter(s) mucous membrane every 12 hours  dexamethasone 10 mg/mL injectable solution: 10 milligram(s) intravenously every 6 hours   levothyroxine 137 mcg (0.137 mg) oral tablet: 1 tab(s) orally once a day  melatonin 3 mg oral tablet: 1 tab(s) orally once a day (at bedtime)  niMODipine 60 mg/20 mL oral liquid: 10 milliliters orally every 8 hours until 8/29/22 then 20 milliliter(s) orally every 8 hours starting Tuesday, 8/30/22  pantoprazole 40 mg oral delayed release tablet: 1 tab(s) orally once a day (before a meal)

## 2022-08-25 NOTE — PROGRESS NOTE ADULT - SUBJECTIVE AND OBJECTIVE BOX
· Subjective and Objective:   Patient seen and examined at bedside. Patient extubated yesterday. Denies difficulty breathing    ICU Vital Signs Last 24 Hrs  T(C): 37.7 (25 Aug 2022 04:00), Max: 37.7 (25 Aug 2022 04:00)  T(F): 99.9 (25 Aug 2022 04:00), Max: 99.9 (25 Aug 2022 04:00)  HR: 87 (25 Aug 2022 07:00) (73 - 94)  BP: 129/85 (25 Aug 2022 06:00) (122/94 - 147/94)  BP(mean): 99 (25 Aug 2022 06:00) (88 - 107)  ABP: --  ABP(mean): --  RR: 19 (25 Aug 2022 07:00) (15 - 24)  SpO2: 97% (25 Aug 2022 07:00) (84% - 100%)    O2 Parameters below as of 25 Aug 2022 07:00  Patient On (Oxygen Delivery Method): room air      General: NAD, AOx3  Breathing comfortably on nasal cannula   AD: Pinna wnl, EAC clear  AS: Pinna wnl, EAC clear  Nose: nasal cavity clear bilaterally, inferior turbinates normal, mucosa normal without crusting or bleeding  OC/OP: tongue normal, floor of mouth wnl, no masses or lesions, OP clear  Neck: neck incision c/d/i, R neck YOLETTE with dark sanguinous output      Assessment and Recommendation:   · Assessment	  Assessment/Plan:  51yo M hx with papillary thyroid cancer as/p total thyroidectomy, central neck dissection on 8/22, c/b b/l TVC paresis, intubated 8/22 and extubated on 8/24.   - nimodipine 30 mg q8 x1 week then 60 mg q8 x 12 weeks for vocal cord paresis  - Continue decadron 10mg q6 and PPI and IV synthroid  - Will d/w Dr. Billy extubation plan  - Records Is and Os  - ENT to follow

## 2022-08-25 NOTE — PROGRESS NOTE ADULT - SUBJECTIVE AND OBJECTIVE BOX
SICU Daily Progress Note  =====================================================  Interval/Overnight Events:      - no acute events overnight   - extubated yesterday, d/c precedex, ENT scoped after extubation and vocal cord motion present, plan to repeat scope  - IVF changed to D51/2NS@75cc/hr yesterday  - d/c decadron yesterday  - passed TOV  - d/c lantus in AM      HPI:  52 year old male with PMH type 2 DM, HTN, hypothyroidism, papillary thyroid cancer and dyslipidemia who presented to [facility]  for scheduled left thyroidectomy with limited neck dissection, right modified radical neck dissection.  Intraoperative course uneventful, post procedure patient reported to be moving all extremities by OR staff.  When electively extubated post operatively noted to be stridorous, immediate laryngoscopy significant for vocal cord paralysis.  Reintubated without incident and transferred to ICU for further management. Patient transferred to [facility] for extubation under supervision by ENT.     PAST MEDICAL & SURGICAL HISTORY:  Obesity  DM (diabetes mellitus)  Thyroid nodule  HTN (hypertension)  Hyperlipidemia  Other laceration  left middle finger laceration repair 1985        ALLERGIES:  No Known Allergies      --------------------------------------------------------------------------------------    MEDICATIONS:    Neurologic Medications  acetaminophen    Suspension .. 975 milliGRAM(s) Oral every 6 hours  oxyCODONE    Solution 5 milliGRAM(s) Oral every 4 hours PRN Moderate Pain (4 - 6)  oxyCODONE    Solution 10 milliGRAM(s) Oral every 4 hours PRN Severe Pain (7 - 10)    Cardiovascular Medications  niMODipine Oral Solution 30 milliGRAM(s) Enteral Tube <User Schedule>    Gastrointestinal Medications  dextrose 5% + sodium chloride 0.45%. 1000 milliLiter(s) IV Continuous <Continuous>  pantoprazole   Suspension 40 milliGRAM(s) Oral daily    Hematologic/Oncologic Medications  enoxaparin Injectable 40 milliGRAM(s) SubCutaneous every 24 hours    Endocrine/Metabolic Medications  glucagon  Injectable 1 milliGRAM(s) IntraMuscular once  insulin lispro (ADMELOG) corrective regimen sliding scale   SubCutaneous every 6 hours  levothyroxine 137 MICROGram(s) Oral daily      --------------------------------------------------------------------------------------    VITAL SIGNS:  ICU Vital Signs Last 24 Hrs  T(C): 36.8 (24 Aug 2022 20:00), Max: 36.8 (24 Aug 2022 20:00)  T(F): 98.2 (24 Aug 2022 20:00), Max: 98.2 (24 Aug 2022 20:00)  HR: 83 (24 Aug 2022 23:00) (70 - 94)  BP: 145/89 (24 Aug 2022 23:00) (112/75 - 145/89)  BP(mean): 104 (24 Aug 2022 23:00) (87 - 106)  RR: 18 (24 Aug 2022 23:00) (14 - 24)  SpO2: 97% (24 Aug 2022 23:00) (84% - 100%)    O2 Parameters below as of 24 Aug 2022 23:00  O2 Flow (L/min): 2      --------------------------------------------------------------------------------------    INS AND OUTS:  I&O's Detail    23 Aug 2022 07:01  -  24 Aug 2022 07:00  --------------------------------------------------------  IN:    Dexmedetomidine: 232.9 mL    FentaNYL: 1 mL    IV PiggyBack: 300 mL    Lactated Ringers: 1900 mL    Propofol: 41.8 mL  Total IN: 2475.7 mL    OUT:    Bulb (mL): 85 mL    Bulb (mL): 15 mL    Indwelling Catheter - Urethral (mL): 3895 mL  Total OUT: 3995 mL    Total NET: -1519.3 mL      24 Aug 2022 07:01  -  25 Aug 2022 00:07  --------------------------------------------------------  IN:    Dexmedetomidine: 5 mL    dextrose 5% + sodium chloride 0.45%: 1125 mL    Enteral Tube Flush: 100 mL    Lactated Ringers: 150 mL  Total IN: 1380 mL    OUT:    Bulb (mL): 0 mL    Bulb (mL): 104 mL    Indwelling Catheter - Urethral (mL): 350 mL    Voided (mL): 1550 mL  Total OUT: 2004 mL    Total NET: -624 mL        --------------------------------------------------------------------------------------    EXAM  NEUROLOGY:   Exam: Normal, in no acute distress.  Alert and oriented x4.  No focal neurologic deficits.     RESPIRATORY  Exam:  Normal expansion/effort. No respiratory distress.     CARDIOVASCULAR  Exam: S1, S2.  Regular rate and rhythm on monitor.     GI/NUTRITION  Exam: Abdomen soft, Non-tender, Non-distended.    Current Diet: NPO    VASCULAR  Exam: Extremities warm.    MUSCULOSKELETAL  Exam: All extremities moving spontaneously.      METABOLIC / FLUIDS / ELECTROLYTES  dextrose 5% + sodium chloride 0.45%. 1000 milliLiter(s) IV Continuous <Continuous>      HEMATOLOGIC  [x] VTE Prophylaxis: enoxaparin Injectable 40 milliGRAM(s) SubCutaneous every 24 hours      TUBES / LINES / DRAINS    [x] Peripheral IV  [] Central Venous Line     	[] R	[] L	[] IJ	[] Fem	[] SC	Date Placed:   [] Arterial Line		[] R	[] L	[] Fem	[] Rad	[] Ax	Date Placed:   [] PICC		[] Midline		[] Mediport  [] Urinary Catheter		Date Placed:   [x] Necessity of urinary, arterial, and venous catheters discussed    --------------------------------------------------------------------------------------    LABS    ((Insert SICU Labs here))***  --------------------------------------------------------------------------------------     SICU Daily Progress Note  =====================================================  Interval/Overnight Events:      - no acute events overnight   - extubated yesterday, d/c precedex, ENT scoped after extubation and vocal cord motion present, plan to repeat scope  - IVF changed to D51/2NS@75cc/hr yesterday  - d/c decadron yesterday  - passed TOV  - d/c lantus in AM      HPI:  52 year old male with PMH type 2 DM, HTN, hypothyroidism, papillary thyroid cancer and dyslipidemia who presented to [facility]  for scheduled left thyroidectomy with limited neck dissection, right modified radical neck dissection.  Intraoperative course uneventful, post procedure patient reported to be moving all extremities by OR staff.  When electively extubated post operatively noted to be stridorous, immediate laryngoscopy significant for vocal cord paralysis.  Reintubated without incident and transferred to ICU for further management. Patient transferred to [facility] for extubation under supervision by ENT.     PAST MEDICAL & SURGICAL HISTORY:  Obesity  DM (diabetes mellitus)  Thyroid nodule  HTN (hypertension)  Hyperlipidemia  Other laceration  left middle finger laceration repair 1985        ALLERGIES:  No Known Allergies      --------------------------------------------------------------------------------------    MEDICATIONS:    Neurologic Medications  acetaminophen    Suspension .. 975 milliGRAM(s) Oral every 6 hours  oxyCODONE    Solution 5 milliGRAM(s) Oral every 4 hours PRN Moderate Pain (4 - 6)  oxyCODONE    Solution 10 milliGRAM(s) Oral every 4 hours PRN Severe Pain (7 - 10)    Cardiovascular Medications  niMODipine Oral Solution 30 milliGRAM(s) Enteral Tube <User Schedule>    Gastrointestinal Medications  dextrose 5% + sodium chloride 0.45%. 1000 milliLiter(s) IV Continuous <Continuous>  pantoprazole   Suspension 40 milliGRAM(s) Oral daily    Hematologic/Oncologic Medications  enoxaparin Injectable 40 milliGRAM(s) SubCutaneous every 24 hours    Endocrine/Metabolic Medications  glucagon  Injectable 1 milliGRAM(s) IntraMuscular once  insulin lispro (ADMELOG) corrective regimen sliding scale   SubCutaneous every 6 hours  levothyroxine 137 MICROGram(s) Oral daily      --------------------------------------------------------------------------------------    VITAL SIGNS:  ICU Vital Signs Last 24 Hrs  T(C): 36.8 (24 Aug 2022 20:00), Max: 36.8 (24 Aug 2022 20:00)  T(F): 98.2 (24 Aug 2022 20:00), Max: 98.2 (24 Aug 2022 20:00)  HR: 83 (24 Aug 2022 23:00) (70 - 94)  BP: 145/89 (24 Aug 2022 23:00) (112/75 - 145/89)  BP(mean): 104 (24 Aug 2022 23:00) (87 - 106)  RR: 18 (24 Aug 2022 23:00) (14 - 24)  SpO2: 97% (24 Aug 2022 23:00) (84% - 100%)    O2 Parameters below as of 24 Aug 2022 23:00  O2 Flow (L/min): 2      --------------------------------------------------------------------------------------    INS AND OUTS:  I&O's Detail    23 Aug 2022 07:01  -  24 Aug 2022 07:00  --------------------------------------------------------  IN:    Dexmedetomidine: 232.9 mL    FentaNYL: 1 mL    IV PiggyBack: 300 mL    Lactated Ringers: 1900 mL    Propofol: 41.8 mL  Total IN: 2475.7 mL    OUT:    Bulb (mL): 85 mL    Bulb (mL): 15 mL    Indwelling Catheter - Urethral (mL): 3895 mL  Total OUT: 3995 mL    Total NET: -1519.3 mL      24 Aug 2022 07:01  -  25 Aug 2022 00:07  --------------------------------------------------------  IN:    Dexmedetomidine: 5 mL    dextrose 5% + sodium chloride 0.45%: 1125 mL    Enteral Tube Flush: 100 mL    Lactated Ringers: 150 mL  Total IN: 1380 mL    OUT:    Bulb (mL): 0 mL    Bulb (mL): 104 mL    Indwelling Catheter - Urethral (mL): 350 mL    Voided (mL): 1550 mL  Total OUT: 2004 mL    Total NET: -624 mL        --------------------------------------------------------------------------------------    EXAM  NEUROLOGY:   Exam: Normal, in no acute distress.  Alert and oriented x4.  No focal neurologic deficits.     RESPIRATORY  Exam:  Normal expansion/effort. No respiratory distress.     CARDIOVASCULAR  Exam: S1, S2.  Regular rate and rhythm on monitor.     GI/NUTRITION  Exam: Abdomen soft, Non-tender, Non-distended.    Current Diet: NPO    VASCULAR  Exam: Extremities warm.    MUSCULOSKELETAL  Exam: All extremities moving spontaneously.      METABOLIC / FLUIDS / ELECTROLYTES  dextrose 5% + sodium chloride 0.45%. 1000 milliLiter(s) IV Continuous <Continuous>      HEMATOLOGIC  [x] VTE Prophylaxis: enoxaparin Injectable 40 milliGRAM(s) SubCutaneous every 24 hours      TUBES / LINES / DRAINS    [x] Peripheral IV  [] Central Venous Line     	[] R	[] L	[] IJ	[] Fem	[] SC	Date Placed:   [] Arterial Line		[] R	[] L	[] Fem	[] Rad	[] Ax	Date Placed:   [] PICC		[] Midline		[] Mediport  [] Urinary Catheter		Date Placed:   [x] Necessity of urinary, arterial, and venous catheters discussed    --------------------------------------------------------------------------------------    LABS                          14.6   13.78 )-----------( 233      ( 25 Aug 2022 00:45 )             42.6     08-25    138  |  106  |  15  ----------------------------<  234<H>  3.3<L>   |  22  |  0.78    Ca    8.5      25 Aug 2022 00:45  Phos  3.0     08-24  Mg     1.70     08-25      --------------------------------------------------------------------------------------

## 2022-08-25 NOTE — DISCHARGE NOTE PROVIDER - NSDCCPCAREPLAN_GEN_ALL_CORE_FT
PRINCIPAL DISCHARGE DIAGNOSIS  Diagnosis: Papillary thyroid carcinoma  Assessment and Plan of Treatment: Follow up outpatient

## 2022-08-25 NOTE — PHYSICAL THERAPY INITIAL EVALUATION ADULT - GENERAL OBSERVATIONS, REHAB EVAL
Consult received, chart reviewed. Patient received in bed, no apparent distress, +cardiac monitor, +YOLETTE drain.

## 2022-08-25 NOTE — SWALLOW BEDSIDE ASSESSMENT ADULT - SWALLOW EVAL: DIAGNOSIS
1. Functional oral stage for puree, regular solids, moderately thick liquids, mildly thick liquids and thin liquids marked by adequate oral acceptance, collection/chewing and transfer with adequate oral clearance post primary swallow. 2. Functional pharyngeal phase for the aforementioned consistencies marked by a present pharyngeal swallow trigger with hyolaryngeal elevation upon digital palpation without evidence of airway penetration/aspiration.

## 2022-08-25 NOTE — DISCHARGE NOTE PROVIDER - CARE PROVIDER_API CALL
Joe Billy)  [facility] Surgery; Otolaryngology  [address] [place] [id number]  Phone: [phone]  Fax: [phone]  Follow Up Time: 1 week

## 2022-08-25 NOTE — PROGRESS NOTE ADULT - ASSESSMENT
ASSESSMENT:  52 year old male with PMH type 2 DM, HTN, hypothyroidism, papillary thyroid cancer and dyslipidemia s/p total thyroidectomy with right modified neck dissection c/b stridor post extubation and found to have vocal cord paralysis on DL. Patient was reintubated and transferred to [facility] for extubation under supervision.     PLAN:  PLAN:   NEUROLOGIC   - awake, reading a book   - pain control: tylenol suspension, oxycodone PRN     RESPIRATORY   - satting well on NC  - Monitor SpO2 goal >92%    CARDIOVASCULAR   - Monitor hemodynamics, MAP >65  - nimodipine 30 q8 x 7 days, 60 q8 x12 weeks for vocal paralysis per ENT     GASTROINTESTINAL   - Diet: NPO/ KO feeding tube   - protonix     /RENAL   - IV fluids: LR @ 75cc/hr  - Strict I/Os  - Monitor BMP qd  - Monitor electrolytes, replete PRN    HEMATOLOGIC  - Monitor H/H   - DVT ppx: Lovenox & SCD's    INFECTIOUS DISEASE  - Monitor fever / WBC    ENDOCRINE  - s/p decadron course  - Monitor gluc q6   - Lantus 11U/ ISS   - 137 mcg synthroid     LINES  - PIV   - YOLETTE drains x 2 (right / left)    DISPO:   - SICU, list today for floor?   ASSESSMENT:  52 year old male with PMH type 2 DM, HTN, hypothyroidism, papillary thyroid cancer and dyslipidemia s/p total thyroidectomy with right modified neck dissection c/b stridor post extubation and found to have vocal cord paralysis on DL. Patient was reintubated and transferred to [facility] for extubation under supervision.     PLAN:   NEUROLOGIC   - no active issues   - pain control: tylenol suspension, oxycodone PRN     RESPIRATORY   - satting well on NC  - Monitor SpO2 goal >92%    CARDIOVASCULAR   - Monitor hemodynamics, MAP >65  - nimodipine 30 q8 x 7 days, 60 q8 x12 weeks for vocal paralysis per ENT     GASTROINTESTINAL   - Diet: NPO/ KO feeding tube   - protonix     /RENAL   - IV fluids: LR @ 75cc/hr  - Strict I/Os  - Monitor BMP qd  - Monitor electrolytes, replete PRN    HEMATOLOGIC  - Monitor H/H   - DVT ppx: Lovenox & SCD's    INFECTIOUS DISEASE  - Monitor fever / WBC    ENDOCRINE  - s/p decadron course  - Monitor gluc q6, ISS  - 137 mcg synthroid     LINES  - PIV   - YOLETTE drains x 2 (right / left)    DISPO:   - SICU, list today for floor?   ASSESSMENT:  52 year old male with PMH type 2 DM, HTN, hypothyroidism, papillary thyroid cancer and dyslipidemia s/p total thyroidectomy with right modified neck dissection c/b stridor post extubation and found to have vocal cord paralysis on DL. Patient was reintubated and transferred to [facility] for extubation under supervision.     PLAN:   NEUROLOGIC   - no active issues   - pain control: tylenol suspension, oxycodone PRN     RESPIRATORY   - satting well on 2L NC  - Monitor SpO2 goal >92%  - OOB, PT  - f/u ENT recs    CARDIOVASCULAR   - Monitor hemodynamics, MAP >65  - nimodipine 30 q8 x 7 days, 60 q8 x12 weeks for vocal paralysis per ENT     GASTROINTESTINAL   - Diet: NPO/ KO feeding tube   - pending official speech & swallow evaluation today   - protonix     /RENAL   - IV fluids: D5 1/2NS @ 75cc/hr  - Strict I/Os  - Monitor BMP qd  - Monitor electrolytes, replete PRN    HEMATOLOGIC  - Monitor H/H   - DVT ppx: Lovenox & SCD's    INFECTIOUS DISEASE  - Monitor fever / WBC    ENDOCRINE  - s/p decadron course  - Monitor gluc q6 -> will recalculate insulin requirements  - ISS   - 137 mcg synthroid     LINES  - PIV   - YOLETTE drains x 2 (right / left)    DISPO:   - SICU

## 2022-08-25 NOTE — SWALLOW BEDSIDE ASSESSMENT ADULT - COMMENTS
Patient seen at chairside in the SICU this afternoon for an initial assessment of the swallow. Patient with adequate voicing and able to verbalize wants/needs. RN reporting patient passed dysphagia screen last night. ENT note 8/25 "51yo M hx with papillary thyroid cancer as/p total thyroidectomy, central neck dissection on 8/22, c/b b/l TVC paresis, intubated 8/22 and extubated on 8/24."    SICU note 8/25 "52 year old male with PMH type 2 DM, HTN, hypothyroidism, papillary thyroid cancer and dyslipidemia who presented to [facility]  for scheduled left thyroidectomy with limited neck dissection, right modified radical neck dissection. Intraoperative course uneventful, post procedure patient reported to be moving all extremities by OR staff.  When electively extubated post operatively noted to be stridorous, immediate laryngoscopy significant for vocal cord paralysis.  Reintubated without incident and transferred to ICU for further management. Patient transferred to [facility] for extubation under supervision by ENT. "    Patient seen at chairside in the SICU this afternoon for an initial assessment of the swallow. Patient with adequate voicing and able to verbalize wants/needs. RN reporting patient passed dysphagia screen last night. ENT note 8/25 "51yo M hx with papillary thyroid cancer as/p total thyroidectomy, central neck dissection on 8/22, c/b b/l TVC paresis, intubated 8/22 and extubated on 8/24."    CXR 8/22 "Lungs are free of focal abnormalities"    SICU note 8/25 "52 year old male with PMH type 2 DM, HTN, hypothyroidism, papillary thyroid cancer and dyslipidemia who presented to [facility]  for scheduled left thyroidectomy with limited neck dissection, right modified radical neck dissection. Intraoperative course uneventful, post procedure patient reported to be moving all extremities by OR staff.  When electively extubated post operatively noted to be stridorous, immediate laryngoscopy significant for vocal cord paralysis.  Reintubated without incident and transferred to ICU for further management. Patient transferred to [facility] for extubation under supervision by ENT. "    Patient seen at chairside in the SICU this afternoon for an initial assessment of the swallow. Patient with adequate voicing and able to verbalize wants/needs. RN reporting patient passed dysphagia screen last night.

## 2022-08-26 LAB
ANION GAP SERPL CALC-SCNC: 11 MMOL/L — SIGNIFICANT CHANGE UP (ref 7–14)
BUN SERPL-MCNC: 16 MG/DL — SIGNIFICANT CHANGE UP (ref 7–23)
CALCIUM SERPL-MCNC: 8.4 MG/DL — SIGNIFICANT CHANGE UP (ref 8.4–10.5)
CHLORIDE SERPL-SCNC: 105 MMOL/L — SIGNIFICANT CHANGE UP (ref 98–107)
CO2 SERPL-SCNC: 21 MMOL/L — LOW (ref 22–31)
CREAT SERPL-MCNC: 0.72 MG/DL — SIGNIFICANT CHANGE UP (ref 0.5–1.3)
EGFR: 109 ML/MIN/1.73M2 — SIGNIFICANT CHANGE UP
GLUCOSE BLDC GLUCOMTR-MCNC: 178 MG/DL — HIGH (ref 70–99)
GLUCOSE BLDC GLUCOMTR-MCNC: 187 MG/DL — HIGH (ref 70–99)
GLUCOSE BLDC GLUCOMTR-MCNC: 199 MG/DL — HIGH (ref 70–99)
GLUCOSE BLDC GLUCOMTR-MCNC: 224 MG/DL — HIGH (ref 70–99)
GLUCOSE SERPL-MCNC: 212 MG/DL — HIGH (ref 70–99)
HCT VFR BLD CALC: 43 % — SIGNIFICANT CHANGE UP (ref 39–50)
HGB BLD-MCNC: 15 G/DL — SIGNIFICANT CHANGE UP (ref 13–17)
MAGNESIUM SERPL-MCNC: 1.6 MG/DL — SIGNIFICANT CHANGE UP (ref 1.6–2.6)
MCHC RBC-ENTMCNC: 30.5 PG — SIGNIFICANT CHANGE UP (ref 27–34)
MCHC RBC-ENTMCNC: 34.9 GM/DL — SIGNIFICANT CHANGE UP (ref 32–36)
MCV RBC AUTO: 87.4 FL — SIGNIFICANT CHANGE UP (ref 80–100)
NRBC # BLD: 0 /100 WBCS — SIGNIFICANT CHANGE UP (ref 0–0)
NRBC # FLD: 0 K/UL — SIGNIFICANT CHANGE UP (ref 0–0)
PHOSPHATE SERPL-MCNC: 2.4 MG/DL — LOW (ref 2.5–4.5)
PLATELET # BLD AUTO: 214 K/UL — SIGNIFICANT CHANGE UP (ref 150–400)
POTASSIUM SERPL-MCNC: 3.9 MMOL/L — SIGNIFICANT CHANGE UP (ref 3.5–5.3)
POTASSIUM SERPL-SCNC: 3.9 MMOL/L — SIGNIFICANT CHANGE UP (ref 3.5–5.3)
RBC # BLD: 4.92 M/UL — SIGNIFICANT CHANGE UP (ref 4.2–5.8)
RBC # FLD: 13.2 % — SIGNIFICANT CHANGE UP (ref 10.3–14.5)
SODIUM SERPL-SCNC: 137 MMOL/L — SIGNIFICANT CHANGE UP (ref 135–145)
WBC # BLD: 10.64 K/UL — HIGH (ref 3.8–10.5)
WBC # FLD AUTO: 10.64 K/UL — HIGH (ref 3.8–10.5)

## 2022-08-26 PROCEDURE — 99232 SBSQ HOSP IP/OBS MODERATE 35: CPT | Mod: 25

## 2022-08-26 RX ORDER — NIMODIPINE 60 MG/10ML
30 SOLUTION ORAL
Qty: 18 | Refills: 0
Start: 2022-08-26 | End: 2022-08-31

## 2022-08-26 RX ORDER — PANTOPRAZOLE SODIUM 20 MG/1
1 TABLET, DELAYED RELEASE ORAL
Qty: 30 | Refills: 0
Start: 2022-08-26 | End: 2022-09-24

## 2022-08-26 RX ORDER — LANOLIN ALCOHOL/MO/W.PET/CERES
1 CREAM (GRAM) TOPICAL
Qty: 0 | Refills: 0 | DISCHARGE
Start: 2022-08-26

## 2022-08-26 RX ORDER — NIMODIPINE 60 MG/10ML
20 SOLUTION ORAL
Qty: 15120 | Refills: 0
Start: 2022-08-26 | End: 2023-05-04

## 2022-08-26 RX ORDER — CHLORHEXIDINE GLUCONATE 213 G/1000ML
15 SOLUTION TOPICAL
Qty: 210 | Refills: 0
Start: 2022-08-26 | End: 2022-09-01

## 2022-08-26 RX ORDER — LEVOTHYROXINE SODIUM 125 MCG
1 TABLET ORAL
Qty: 30 | Refills: 3
Start: 2022-08-26 | End: 2022-12-23

## 2022-08-26 RX ORDER — NIMODIPINE 60 MG/10ML
10 SOLUTION ORAL
Qty: 180 | Refills: 0
Start: 2022-08-26 | End: 2022-08-31

## 2022-08-26 RX ORDER — SODIUM,POTASSIUM PHOSPHATES 278-250MG
1 POWDER IN PACKET (EA) ORAL ONCE
Refills: 0 | Status: COMPLETED | OUTPATIENT
Start: 2022-08-26 | End: 2022-08-26

## 2022-08-26 RX ADMIN — Medication 2: at 16:52

## 2022-08-26 RX ADMIN — INSULIN GLARGINE 10 UNIT(S): 100 INJECTION, SOLUTION SUBCUTANEOUS at 22:14

## 2022-08-26 RX ADMIN — PANTOPRAZOLE SODIUM 40 MILLIGRAM(S): 20 TABLET, DELAYED RELEASE ORAL at 06:09

## 2022-08-26 RX ADMIN — Medication 1000 MILLIGRAM(S): at 19:00

## 2022-08-26 RX ADMIN — Medication 1000 MILLIGRAM(S): at 18:26

## 2022-08-26 RX ADMIN — Medication 1 TABLET(S): at 06:09

## 2022-08-26 RX ADMIN — ENOXAPARIN SODIUM 40 MILLIGRAM(S): 100 INJECTION SUBCUTANEOUS at 06:09

## 2022-08-26 RX ADMIN — Medication 4: at 22:14

## 2022-08-26 RX ADMIN — NIMODIPINE 30 MILLIGRAM(S): 60 SOLUTION ORAL at 08:59

## 2022-08-26 RX ADMIN — Medication 2: at 07:52

## 2022-08-26 RX ADMIN — Medication 137 MICROGRAM(S): at 06:09

## 2022-08-26 RX ADMIN — Medication 2: at 11:40

## 2022-08-26 RX ADMIN — NIMODIPINE 30 MILLIGRAM(S): 60 SOLUTION ORAL at 16:55

## 2022-08-26 RX ADMIN — Medication 3 MILLIGRAM(S): at 22:15

## 2022-08-26 RX ADMIN — Medication 1000 MILLIGRAM(S): at 12:00

## 2022-08-26 RX ADMIN — Medication 1000 MILLIGRAM(S): at 11:40

## 2022-08-26 NOTE — PROGRESS NOTE ADULT - ASSESSMENT
ASSESSMENT:  52 year old male with PMH type 2 DM, HTN, hypothyroidism, papillary thyroid cancer and dyslipidemia s/p total thyroidectomy with right modified neck dissection c/b stridor post extubation and found to have vocal cord paralysis on DL. Patient was reintubated and transferred to [facility] for extubation under supervision.     PLAN:   NEUROLOGIC   - pain control with Tylenol and Oxycodone PRN    RESPIRATORY   - Monitor SpO2 goal >92%  - OOB, PT    CARDIOVASCULAR   - MAP >65  - nimodipine 30 q8 x 7 days, 60 q8 x12 weeks for vocal paralysis per ENT     GASTROINTESTINAL   - Diet: consistent carb regular    /RENAL   - Monitor BMP qd  - Monitor electrolytes, replete PRN    HEMATOLOGIC  - Monitor H/H on CBC daily  - DVT ppx: Lovenox & SCD's    INFECTIOUS DISEASE  - Monitor fever / WBC    ENDOCRINE  - s/p decadron course  - Lantus 10 @ bedtime  - ISS   - 137 mcg synthroid     LINES  - PIV   - YOLETTE drains x 2 (right / left)     ASSESSMENT:  52 year old male with PMH type 2 DM, HTN, hypothyroidism, papillary thyroid cancer and dyslipidemia s/p total thyroidectomy with right modified neck dissection c/b stridor post extubation and found to have vocal cord paralysis on DL. Patient was reintubated and transferred to [facility] for extubation under supervision.     PLAN:   NEUROLOGIC   - pain control with Tylenol and Oxycodone PRN    RESPIRATORY   - Satting well on room air  - Monitor SpO2 goal >92%  - OOB, PT    CARDIOVASCULAR   - MAP >65  - nimodipine 30 q8 x 7 days, 60 q8 x12 weeks for vocal paralysis per ENT     GASTROINTESTINAL   - Diet: consistent carb regular    /RENAL   - Monitor BMP qd  - Monitor electrolytes, replete PRN    HEMATOLOGIC  - Monitor H/H on CBC daily  - DVT ppx: Lovenox & SCD's    INFECTIOUS DISEASE  - Monitor fever / WBC    ENDOCRINE  - s/p decadron course  - Lantus 10 @ bedtime  - ISS   - 137 mcg synthroid     LINES  - PIV   - YOLETTE drains x 2 (right / left)

## 2022-08-26 NOTE — PROGRESS NOTE ADULT - SUBJECTIVE AND OBJECTIVE BOX
SICU Daily Progress Note  =====================================================  Interval/Overnight Events:       - no events overnight   - DC KO tube/pass speech and swallow; started on consistent carbs reg diet yesterday. tolerating diet  - Added lantus 10 units yesterday  - remains listed for floors      HPI:  52 year old male with PMH type 2 DM, HTN, hypothyroidism, papillary thyroid cancer and dyslipidemia [facility] thyroidectomy with limited neck dissection, right modified radical neck dissection.  Intraoperative course uneventful, post procedure patient reported to be moving all extremities by OR staff.  When electively extubated post operatively noted to be stridorous, immediate laryngoscopy significant for vocal cord paralysis.  Reintubated without incident and transferred to ICU for further management. Patient transferred to [facility] for extubation under supervision by ENT.     PAST MEDICAL & SURGICAL HISTORY:  Obesity  DM (diabetes mellitus)  Thyroid nodule  HTN (hypertension)  Hyperlipidemi  Other laceration  left middle finger laceration repair 1985      ALLERGIES:  No Known Allergies      --------------------------------------------------------------------------------------    MEDICATIONS:    Neurologic Medications  acetaminophen     Tablet .. 1000 milliGRAM(s) Oral every 6 hours  melatonin 3 milliGRAM(s) Oral at bedtime  oxyCODONE    IR 5 milliGRAM(s) Oral every 4 hours PRN Moderate Pain (4 - 6)  oxyCODONE    IR 10 milliGRAM(s) Oral every 4 hours PRN Severe Pain (7 - 10)    Cardiovascular Medications  niMODipine Oral Solution 30 milliGRAM(s) Oral <User Schedule>    Gastrointestinal Medications  pantoprazole    Tablet 40 milliGRAM(s) Oral before breakfast    Hematologic/Oncologic Medications  enoxaparin Injectable 40 milliGRAM(s) SubCutaneous every 24 hours      Endocrine/Metabolic Medications  glucagon  Injectable 1 milliGRAM(s) IntraMuscular once  insulin glargine Injectable (LANTUS) 10 Unit(s) SubCutaneous at bedtime  insulin lispro (ADMELOG) corrective regimen sliding scale   SubCutaneous Before meals and at bedtime  levothyroxine 137 MICROGram(s) Oral daily      --------------------------------------------------------------------------------------    VITAL SIGNS:  ICU Vital Signs Last 24 Hrs  T(C): 35.9 (25 Aug 2022 20:00), Max: 37.7 (25 Aug 2022 04:00)  T(F): 96.7 (25 Aug 2022 20:00), Max: 99.9 (25 Aug 2022 04:00)  HR: 76 (25 Aug 2022 20:00) (74 - 87)  BP: 129/79 (25 Aug 2022 20:00) (129/79 - 147/94)  BP(mean): 94 (25 Aug 2022 20:00) (92 - 107)  RR: 26 (25 Aug 2022 20:00) (13 - 26)  SpO2: 100% (25 Aug 2022 20:00) (95% - 100%)    O2 Parameters below as of 25 Aug 2022 10:00  Patient On (Oxygen Delivery Method): room air    --------------------------------------------------------------------------------------    INS AND OUTS:  I&O's Detail    24 Aug 2022 07:01  -  25 Aug 2022 07:00  --------------------------------------------------------  IN:    Dexmedetomidine: 5 mL    dextrose 5% + sodium chloride 0.45%: 1650 mL    Enteral Tube Flush: 300 mL    IV PiggyBack: 50 mL    Lactated Ringers: 150 mL  Total IN: 2155 mL    OUT:    Bulb (mL): 128 mL    Bulb (mL): 2 mL    Indwelling Catheter - Urethral (mL): 350 mL    Voided (mL): 3100 mL  Total OUT: 3580 mL    Total NET: -1425 mL      25 Aug 2022 07:01  -  26 Aug 2022 00:08  --------------------------------------------------------  IN:    dextrose 5% + sodium chloride 0.45%: 375 mL  Total IN: 375 mL    OUT:    Voided (mL): 800 mL  Total OUT: 800 mL    Total NET: -425 mL        --------------------------------------------------------------------------------------    EXAM  NEUROLOGY:   Exam: Normal, in no acute distress.  Alert and oriented x4.  No focal neurologic deficits.     RESPIRATORY  Exam:  Normal expansion/effort. No respiratory distress.     CARDIOVASCULAR  Exam: S1, S2.  Regular rate and rhythm on monitor.     GI/NUTRITION  Exam: Abdomen soft, Non-tender, Non-distended.    Current Diet: CC Regular     VASCULAR  Exam: Extremities warm.    MUSCULOSKELETAL  Exam: All extremities moving spontaneously.          HEMATOLOGIC  [x] VTE Prophylaxis: enoxaparin Injectable 40 milliGRAM(s) SubCutaneous every 24 hours    TUBES / LINES / DRAINS    [x] Peripheral IV  [] Central Venous Line     	[] R	[] L	[] IJ	[] Fem	[] SC	Date Placed:   [] Arterial Line		[] R	[] L	[] Fem	[] Rad	[] Ax	Date Placed:   [] PICC		[] Midline		[] Mediport  [] Urinary Catheter		Date Placed:   [x] Necessity of urinary, arterial, and venous catheters discussed    --------------------------------------------------------------------------------------    LABS    ((Insert SICU Labs here))***  --------------------------------------------------------------------------------------

## 2022-08-26 NOTE — PROGRESS NOTE ADULT - SUBJECTIVE AND OBJECTIVE BOX
· Subjective and Objective:   Patient seen and examined at bedside.  Denies difficulty breathing. Tolerating diet.    ICU Vital Signs Last 24 Hrs  T(C): 36.6 (26 Aug 2022 04:00), Max: 37.3 (25 Aug 2022 08:00)  T(F): 97.8 (26 Aug 2022 04:00), Max: 99.1 (25 Aug 2022 08:00)  HR: 84 (26 Aug 2022 04:00) (74 - 84)  BP: 131/87 (26 Aug 2022 04:00) (129/79 - 140/86)  BP(mean): 98 (26 Aug 2022 04:00) (92 - 102)  ABP: --  ABP(mean): --  RR: 24 (26 Aug 2022 04:00) (13 - 26)  SpO2: 94% (26 Aug 2022 04:00) (94% - 100%)    O2 Parameters below as of 25 Aug 2022 10:00  Patient On (Oxygen Delivery Method): room air      General: NAD, AOx3  Breathing comfortably on nasal cannula   AD: Pinna wnl, EAC clear  AS: Pinna wnl, EAC clear  Nose: nasal cavity clear bilaterally, inferior turbinates normal, mucosa normal without crusting or bleeding  OC/OP: tongue normal, floor of mouth wnl, no masses or lesions, OP clear  Neck: neck incision c/d/i, R neck YOLETTE with dark sanguinous output      Assessment and Recommendation:   · Assessment	  Assessment/Plan:  51yo M hx with papillary thyroid cancer as/p total thyroidectomy, central neck dissection on 8/22, c/b b/l TVC paresis, intubated 8/22 and extubated on 8/24.   - nimodipine 30 mg q8 x1 week then 60 mg q8 x 12 weeks for vocal cord paresis  - continue diet  - possible dispo today vs tomorrow  - Records Is and Os  - ENT to follow

## 2022-08-27 ENCOUNTER — TRANSCRIPTION ENCOUNTER (OUTPATIENT)
Age: 52
End: 2022-08-27

## 2022-08-27 VITALS
RESPIRATION RATE: 14 BRPM | HEART RATE: 81 BPM | DIASTOLIC BLOOD PRESSURE: 74 MMHG | TEMPERATURE: 98 F | SYSTOLIC BLOOD PRESSURE: 120 MMHG | OXYGEN SATURATION: 96 %

## 2022-08-27 LAB
GLUCOSE BLDC GLUCOMTR-MCNC: 183 MG/DL — HIGH (ref 70–99)
GLUCOSE BLDC GLUCOMTR-MCNC: 197 MG/DL — HIGH (ref 70–99)

## 2022-08-27 PROCEDURE — 99232 SBSQ HOSP IP/OBS MODERATE 35: CPT

## 2022-08-27 RX ORDER — LEVOTHYROXINE SODIUM 125 MCG
1 TABLET ORAL
Qty: 30 | Refills: 3
Start: 2022-08-27 | End: 2022-12-24

## 2022-08-27 RX ORDER — CHLORHEXIDINE GLUCONATE 213 G/1000ML
15 SOLUTION TOPICAL
Qty: 210 | Refills: 0
Start: 2022-08-27 | End: 2022-09-02

## 2022-08-27 RX ORDER — NIMODIPINE 60 MG/10ML
20 SOLUTION ORAL
Qty: 1800 | Refills: 0
Start: 2022-08-27 | End: 2022-09-25

## 2022-08-27 RX ORDER — PANTOPRAZOLE SODIUM 20 MG/1
1 TABLET, DELAYED RELEASE ORAL
Qty: 30 | Refills: 0
Start: 2022-08-27 | End: 2022-09-25

## 2022-08-27 RX ORDER — NIMODIPINE 60 MG/10ML
20 SOLUTION ORAL
Qty: 1800 | Refills: 1
Start: 2022-08-27 | End: 2022-10-25

## 2022-08-27 RX ORDER — LEVOTHYROXINE SODIUM 125 MCG
1 TABLET ORAL
Qty: 30 | Refills: 1
Start: 2022-08-27 | End: 2022-10-25

## 2022-08-27 RX ADMIN — Medication 1000 MILLIGRAM(S): at 00:01

## 2022-08-27 RX ADMIN — Medication 1000 MILLIGRAM(S): at 11:35

## 2022-08-27 RX ADMIN — Medication 1000 MILLIGRAM(S): at 11:30

## 2022-08-27 RX ADMIN — Medication 1000 MILLIGRAM(S): at 00:10

## 2022-08-27 RX ADMIN — NIMODIPINE 30 MILLIGRAM(S): 60 SOLUTION ORAL at 16:31

## 2022-08-27 RX ADMIN — Medication 1000 MILLIGRAM(S): at 05:18

## 2022-08-27 RX ADMIN — Medication 2: at 07:27

## 2022-08-27 RX ADMIN — NIMODIPINE 30 MILLIGRAM(S): 60 SOLUTION ORAL at 00:10

## 2022-08-27 RX ADMIN — PANTOPRAZOLE SODIUM 40 MILLIGRAM(S): 20 TABLET, DELAYED RELEASE ORAL at 05:27

## 2022-08-27 RX ADMIN — Medication 137 MICROGRAM(S): at 05:18

## 2022-08-27 RX ADMIN — Medication 2: at 11:30

## 2022-08-27 RX ADMIN — NIMODIPINE 30 MILLIGRAM(S): 60 SOLUTION ORAL at 07:28

## 2022-08-27 RX ADMIN — Medication 1000 MILLIGRAM(S): at 05:58

## 2022-08-27 RX ADMIN — ENOXAPARIN SODIUM 40 MILLIGRAM(S): 100 INJECTION SUBCUTANEOUS at 05:27

## 2022-08-27 NOTE — DISCHARGE NOTE NURSING/CASE MANAGEMENT/SOCIAL WORK - NSDCPEFALRISK_GEN_ALL_CORE
For information on Fall & Injury Prevention, visit: [email or web address] OR  [email or web address] OR  https://www.cdc.gov/steadi/patient.html

## 2022-08-27 NOTE — DISCHARGE NOTE NURSING/CASE MANAGEMENT/SOCIAL WORK - NSDCFUADDAPPT_GEN_ALL_CORE_FT
ENT   - Please follow up outpatient call [phone]   - ENT office located at [address] [place] [address]

## 2022-08-27 NOTE — DISCHARGE NOTE NURSING/CASE MANAGEMENT/SOCIAL WORK - NSPROEXTENSIONSOFSELF_GEN_A_NUR
1 black duffel bag  white plastic bag with clothes  glasses  cell phone/none 1 black duffel bag  white plastic bag with clothes  glasses  cell phone/none/eyeglasses

## 2022-08-27 NOTE — DISCHARGE NOTE NURSING/CASE MANAGEMENT/SOCIAL WORK - PATIENT PORTAL LINK FT
You can access the FollowMyHealth Patient Portal offered by [facility] by registering at the following website: [email or web address]. By joining [employer]’s FollowMyHealth portal, you will also be able to view your health information using other applications (apps) compatible with our system.

## 2022-08-27 NOTE — PROGRESS NOTE ADULT - ASSESSMENT
ASSESSMENT:  52 year old male with PMH type 2 DM, HTN, hypothyroidism, papillary thyroid cancer and dyslipidemia s/p total thyroidectomy with right modified neck dissection c/b stridor post extubation and found to have vocal cord paralysis on DL. Patient was reintubated and transferred to [facility] for extubation under supervision.     PLAN:   NEUROLOGIC   - pain control with Tylenol and Oxycodone PRN    RESPIRATORY   - Satting well on room air  - Monitor SpO2 goal >92%  - OOB, PT    CARDIOVASCULAR   - MAP >65  - nimodipine 30 q8 x 7 days, 60 q8 x12 weeks for vocal paralysis per ENT     GASTROINTESTINAL   - Diet: consistent carb regular    /RENAL   - Monitor BMP qd  - Monitor electrolytes, replete PRN    HEMATOLOGIC  - Monitor H/H on CBC daily  - DVT ppx: Lovenox & SCD's    INFECTIOUS DISEASE  - Monitor fever / WBC    ENDOCRINE  - s/p decadron course  - Lantus 10 @ bedtime  - ISS   - 137 mcg synthroid     LINES  - PIV   - YOLETTE drains x 2 (right / left)   ASSESSMENT:  52 year old male with PMH type 2 DM, HTN, hypothyroidism, papillary thyroid cancer and dyslipidemia s/p total thyroidectomy with right modified neck dissection c/b stridor post extubation and found to have vocal cord paralysis on DL. Patient was reintubated and transferred to [facility] for extubation under supervision.     PLAN:   NEUROLOGIC   - pain control with Tylenol and Oxycodone PRN    RESPIRATORY   - Satting well on room air  - Monitor SpO2 goal >92%  - OOB, PT    CARDIOVASCULAR   - MAP >65  - nimodipine 30 q8 x 7 days, 60 q8 x12 weeks for vocal paralysis per ENT     GASTROINTESTINAL   - Diet: consistent carb regular    /RENAL   - Monitor BMP qd  - Monitor electrolytes, replete PRN    HEMATOLOGIC  - Monitor H/H on CBC daily  - DVT ppx: Lovenox & SCD's    INFECTIOUS DISEASE  - Monitor fever / WBC    ENDOCRINE  - s/p decadron course  - Lantus 10 @ bedtime  - ISS   - 137 mcg synthroid     LINES  - PIV   - YOLETTE drains x 2 (right / left)    DISPO:  - plan for discharge today

## 2022-08-27 NOTE — PROGRESS NOTE ADULT - ATTENDING COMMENTS
Patient passed speech and swallow evaluation. NGT removed on diet. Patient rescoped last night with adequate airway movement through vocal cords. Maintain on nimodipine. Possible d/c tomorrow. Patient floor eligible awaiting bed.  I have personally interviewed when possible and examined the patient, reviewed data and laboratory tests/x-rays and all pertinent electronic images.  I was physically present for the key portions of the evaluation and management (E/M) service provided.   The SICU team has a constant risk benefit analyzes discussion with the primary team, all consultants, House Staff and PA's on all decisions.  These diagnoses are unrelated to the surgical procedure noted above.  I meet with family if needed to get further history, discuss the case and make care decisions for this patient who might not be able to participate.  Time involved in performance of separately billable procedures was not counted toward my critical care time. There is no overlap.  I spent 30 minutes ( 0800Hrs-0915Hrs in AM/ 1600Hrs-1715Hrs in PM, or other time indicated) of critical care time for the diagnoses, assessment, plan and interventions.  This time excludes time spent on separate procedures and teaching.
I agree with the detailed interval history, physical, and plan, which I have reviewed and edited where appropriate'; also agree with notes/assessment with my team on service.  I have personally examined the patient.  I was physically present for the key portions of the evaluation and management (E/M) service provided.  I reviewed all the pertinent data.  The patient is a critical care patient with life threatening hemodynamic and metabolic instability in SICU.  The SICU team has a constant risk benefit analyzes discussion and coordinating care with the primary team and all consultants.   The patient is in SICU with the chief complaint and diagnosis mentioned in the note.   The plan will be specified in the note.  52 year old male s/p total thyroidectomy with right modified neck dissection c/b stridor post extubation and found to have vocal cord paralysis in SICU  EXAM  NEUROLOGY:   Exam:  no acute distress.  Alert and oriented x4.   RESPIRATORY  Exam:  clear  CARDIOVASCULAR  Exam:  Regular rate   GI/NUTRITION  Exam: Abdomen soft, Non-tender, Non-distended.    VASCULAR  Exam: Extremities warm.  PLAN:   NEUROLOGIC   - Tylenol and Oxycodone PRN  RESPIRATORY   - room air  -CARDIOVASCULAR   - nimodipine   GASTROINTESTINAL   - Diet: consistent carb regular  /RENAL   - Monitor electrolytes,   HEMATOLOGIC  - DVT ppx: Lovenox & SCD's  INFECTIOUS DISEASE  - Monitor fever / WBC  ENDOCRINE  - Lantus 10 @ bedtime  DISPO:  - plan for discharge today
D/c fentanyl and prop transition to precedex, Patient remained intubated s/p total thyroid with b/l vocal card paresis. Patient with 90% leak around et tube. ENT and anesthesia bedside and extubated. Laryngoscopy revealed b/l vocal cord paresis however cords allowing air through.  N mentating at baseline, no pain, on pain regimen  resp extubated bedside with ent and anesthesia, airway box ready, critical airway, d/c steroids on nimodipine   cv hemodynamically stable  gi npo, ivf, ngt, keep npo, will plan to resume feeds tomorrow once critical airway has improved  gu/renal monito ruop  heme vte ppx  id no abx  endo d/c steroids, monitor fs    The patient is a critical care patient with life threatening hemodynamic and metabolic instability in SICU.  I have personally interviewed when possible and examined the patient, reviewed data and laboratory tests/x-rays and all pertinent electronic images.  I was physically present for the key portions of the evaluation and management (E/M) service provided.   The SICU team has a constant risk benefit analyzes discussion with the primary team, all consultants, House Staff and PA's on all decisions.  These diagnoses are unrelated to the surgical procedure noted above.  I meet with family if needed to get further history, discuss the case and make care decisions for this patient who might not be able to participate.  Time involved in performance of separately billable procedures was not counted toward my critical care time. There is no overlap.  I spent 55-75 minutes ( 0800Hrs-0915Hrs in AM/ 1600Hrs-1715Hrs in PM, or other time indicated) of critical care time for the diagnoses, assessment, plan and interventions.  This time excludes time spent on separate procedures and teaching.
I agree with the detailed interval history, physical, and plan, which I have reviewed and edited where appropriate'; also agree with notes/assessment with my team on service.  I have personally examined the patient.  I was physically present for the key portions of the evaluation and management (E/M) service provided.  I reviewed all the pertinent data.  The patient is a critical care patient with life threatening hemodynamic and metabolic instability in SICU.  The SICU team has a constant risk benefit analyzes discussion and coordinating care with the primary team and all consultants.   The patient is in SICU with the chief complaint and diagnosis mentioned in the note.   The plan will be specified in the note.  52 year old male s/p total thyroidectomy with right modified neck dissection c/b stridor post extubation and found to have vocal cord paralysis in respiratory failure in SICU.  NEURO  Exam: awake,   RESPIRATORY  Exam: clear  Mechanical Ventilation: Mode: AC/ CMV   CARDIOVASCULAR  Exam: warm,   Cardiac Rhythm: RR  GI/NUTRITION  Exam: soft, non-distended,  NEUROLOGIC Intubated and sedated:  - precedex drip, wean propofol  - pain control: IV tylenol, dilaudid PRN   RESPIRATORY:  Acute respiratory insufficiency  - air leak 90%; plan for extubation with ENT bedside tomorrow,   CARDIOVASCULAR   - Monitor hemodynamics, MAP >65  - nimodipine  GASTROINTESTINAL   - Diet: NPO  - - protonix   /RENAL   - IV fluids: LR @ 75cc/hr  HEMATOLOGIC  -Lovenox  & SCD's  INFECTIOUS DISEASE  - Monitor fever / WBC  ENDOCRINE  - Monitor gluc q6   - ISS   - decadron 10 q 6hr   DISPO: SICU    Critical Care Diagnoses: vocal cord paralysis requiring mechanical ventilation
I agree with the detailed interval history, physical, and plan, which I have reviewed and edited where appropriate'; also agree with notes/assessment with my team on service.  I have personally examined the patient.  I was physically present for the key portions of the evaluation and management (E/M) service provided.  I reviewed all the pertinent data.  The patient is a critical care patient with life threatening hemodynamic and metabolic instability in SICU.  The SICU team has a constant risk benefit analyzes discussion and coordinating care with the primary team and all consultants.   The patient is in SICU with the chief complaint and diagnosis mentioned in the note.   The plan will be specified in the note.  52 year old male with PMH type 2 DM, HTN, hypothyroidism, papillary thyroid cancer and dyslipidemia s/p total thyroidectomy with right modified neck dissection c/b stridor post extubation and found to have vocal cord paralysis in respiratory failure in SICU.  NEURO  Exam: arousable  RESPIRATORY  Exam: clear  Mechanical Ventilation: Mode: AC/ CMV  CARDIOVASCULAR  Cardiac Rhythm: RR  GI/NUTRITION  Exam: soft, non-distended, non-tender    NEUROLOGIC - intubated and sedated:   d/c fentanyl and propofol, start precedex  - pain control: IV tylenol, dilaudid PRN   RESPIRATORY   - air leak 90%;   - AC: 12/500/5/40, CPAP as tolerated  CARDIOVASCULAR   -, MAP >65  - nimodipine   GASTROINTESTINAL   - Diet: NPO  - KO feeding tube   - protonix   /RENAL   - IV fluids: LR @ 125cc/hr  HEMATOLOGIC  - DVT ppx: Lovenox  & SCD's  INFECTIOUS DISEASE  - Monitor fever / WBC  ENDOCRINE  - Monitor gluc q6   - ISS   - decadron 10 q 6hr   - 137 mcg synthroid   DISPO: SICU      Critical Care Diagnoses: vocal cord paralysis requiring mechanical ventilation,critical airway
Patient overnight no issues, tolerating extubation. Good airway movement. Plan for scope this am. S/s passed. Will make floor eligible once cleared by ENT. On nimodipine for vocal cord paresis    I have personally interviewed when possible and examined the patient, reviewed data and laboratory tests/x-rays and all pertinent electronic images.  I was physically present for the key portions of the evaluation and management (E/M) service provided.   The SICU team has a constant risk benefit analyzes discussion with the primary team, all consultants, House Staff and PA's on all decisions.  These diagnoses are unrelated to the surgical procedure noted above.  I meet with family if needed to get further history, discuss the case and make care decisions for this patient who might not be able to participate.  Time involved in performance of separately billable procedures was not counted toward my critical care time. There is no overlap.  I spent 30 minutes ( 0800Hrs-0915Hrs in AM/ 1600Hrs-1715Hrs in PM, or other time indicated) of critical care time for the diagnoses, assessment, plan and interventions.  This time excludes time spent on separate procedures and teaching.

## 2022-08-27 NOTE — PROGRESS NOTE ADULT - SUBJECTIVE AND OBJECTIVE BOX
SICU Daily Progress Note  =====================================================  Interval/Overnight Events:       - remains listed for floors, pending dc tomorrow    HPI:  52 year old male with PMH type 2 DM, HTN, hypothyroidism, papillary thyroid cancer and dyslipidemia who presented to [facility]  for scheduled left thyroidectomy with limited neck dissection, right modified radical neck dissection.  Intraoperative course uneventful, post procedure patient reported to be moving all extremities by OR staff.  When electively extubated post operatively noted to be stridorous, immediate laryngoscopy significant for vocal cord paralysis.  Reintubated without incident and transferred to ICU for further management. Patient transferred to [facility] for extubation under supervision by ENT.     PAST MEDICAL & SURGICAL HISTORY:  Obesity  DM (diabetes mellitus)  Thyroid nodule  HTN (hypertension)  Hyperlipidemi  Other laceration  left middle finger laceration repair 1985      ALLERGIES:  No Known Allergies      --------------------------------------------------------------------------------------    MEDICATIONS:    Neurologic Medications  acetaminophen     Tablet .. 1000 milliGRAM(s) Oral every 6 hours  melatonin 3 milliGRAM(s) Oral at bedtime  oxyCODONE    IR 5 milliGRAM(s) Oral every 4 hours PRN Moderate Pain (4 - 6)  oxyCODONE    IR 10 milliGRAM(s) Oral every 4 hours PRN Severe Pain (7 - 10)    Cardiovascular Medications  niMODipine Oral Solution 30 milliGRAM(s) Oral <User Schedule>    Gastrointestinal Medications  pantoprazole    Tablet 40 milliGRAM(s) Oral before breakfast    Hematologic/Oncologic Medications  enoxaparin Injectable 40 milliGRAM(s) SubCutaneous every 24 hours    Endocrine/Metabolic Medications  glucagon  Injectable 1 milliGRAM(s) IntraMuscular once  insulin glargine Injectable (LANTUS) 10 Unit(s) SubCutaneous at bedtime  insulin lispro (ADMELOG) corrective regimen sliding scale   SubCutaneous Before meals and at bedtime  levothyroxine 137 MICROGram(s) Oral daily    --------------------------------------------------------------------------------------    VITAL SIGNS:  ICU Vital Signs Last 24 Hrs  T(C): 36.9 (27 Aug 2022 00:00), Max: 37.3 (26 Aug 2022 08:00)  T(F): 98.4 (27 Aug 2022 00:00), Max: 99.2 (26 Aug 2022 08:00)  HR: 74 (27 Aug 2022 00:00) (71 - 96)  BP: 143/85 (27 Aug 2022 00:00) (131/87 - 155/79)  BP(mean): 101 (27 Aug 2022 00:00) (98 - 111)  RR: 18 (27 Aug 2022 00:00) (12 - 24)  SpO2: 100% (27 Aug 2022 00:00) (94% - 100%)    O2 Parameters below as of 27 Aug 2022 00:00  Patient On (Oxygen Delivery Method): room air      --------------------------------------------------------------------------------------    INS AND OUTS:  I&O's Detail    25 Aug 2022 07:01  -  26 Aug 2022 07:00  --------------------------------------------------------  IN:    dextrose 5% + sodium chloride 0.45%: 375 mL    Oral Fluid: 600 mL  Total IN: 975 mL    OUT:    Voided (mL): 2800 mL  Total OUT: 2800 mL    Total NET: -1825 mL      26 Aug 2022 07:01  -  27 Aug 2022 00:18  --------------------------------------------------------  IN:    Oral Fluid: 1000 mL  Total IN: 1000 mL    OUT:    Bulb (mL): 20 mL    Bulb (mL): 5 mL    Voided (mL): 2225 mL  Total OUT: 2250 mL    Total NET: -1250 mL        --------------------------------------------------------------------------------------  EXAM  NEUROLOGY:   Exam: Normal, in no acute distress.  Alert and oriented x4.  No focal neurologic deficits.     RESPIRATORY  Exam:  Normal expansion/effort. No respiratory distress.     CARDIOVASCULAR  Exam: S1, S2.  Regular rate and rhythm on monitor.     GI/NUTRITION  Exam: Abdomen soft, Non-tender, Non-distended.    Current Diet: CC Regular     VASCULAR  Exam: Extremities warm.    MUSCULOSKELETAL  Exam: All extremities moving spontaneously.          HEMATOLOGIC  [x] VTE Prophylaxis: enoxaparin Injectable 40 milliGRAM(s) SubCutaneous every 24 hours        TUBES / LINES / DRAINS   [x] Peripheral IV  [] Central Venous Line     	[] R	[] L	[] IJ	[] Fem	[] SC	Date Placed:   [] Arterial Line		[] R	[] L	[] Fem	[] Rad	[] Ax	Date Placed:   [] PICC		[] Midline		[] Mediport  [] Urinary Catheter		Date Placed:   [x] Necessity of urinary, arterial, and venous catheters discussed    --------------------------------------------------------------------------------------    LABS    ((Insert SICU Labs here))***  --------------------------------------------------------------------------------------     SICU Daily Progress Note  =====================================================  Interval/Overnight Events:       - remains listed for floors, pending dc today  - ENT removed YOLETTE drains    HPI:  52 year old male with PMH type 2 DM, HTN, hypothyroidism, papillary thyroid cancer and dyslipidemia who presented to [facility]  for scheduled left thyroidectomy with limited neck dissection, right modified radical neck dissection.  Intraoperative course uneventful, post procedure patient reported to be moving all extremities by OR staff.  When electively extubated post operatively noted to be stridorous, immediate laryngoscopy significant for vocal cord paralysis.  Reintubated without incident and transferred to ICU for further management. Patient transferred to [facility] for extubation under supervision by ENT.     PAST MEDICAL & SURGICAL HISTORY:  Obesity  DM (diabetes mellitus)  Thyroid nodule  HTN (hypertension)  Hyperlipidemi  Other laceration  left middle finger laceration repair 1985      ALLERGIES:  No Known Allergies      --------------------------------------------------------------------------------------    MEDICATIONS:    Neurologic Medications  acetaminophen     Tablet .. 1000 milliGRAM(s) Oral every 6 hours  melatonin 3 milliGRAM(s) Oral at bedtime  oxyCODONE    IR 5 milliGRAM(s) Oral every 4 hours PRN Moderate Pain (4 - 6)  oxyCODONE    IR 10 milliGRAM(s) Oral every 4 hours PRN Severe Pain (7 - 10)    Cardiovascular Medications  niMODipine Oral Solution 30 milliGRAM(s) Oral <User Schedule>    Gastrointestinal Medications  pantoprazole    Tablet 40 milliGRAM(s) Oral before breakfast    Hematologic/Oncologic Medications  enoxaparin Injectable 40 milliGRAM(s) SubCutaneous every 24 hours    Endocrine/Metabolic Medications  glucagon  Injectable 1 milliGRAM(s) IntraMuscular once  insulin glargine Injectable (LANTUS) 10 Unit(s) SubCutaneous at bedtime  insulin lispro (ADMELOG) corrective regimen sliding scale   SubCutaneous Before meals and at bedtime  levothyroxine 137 MICROGram(s) Oral daily    --------------------------------------------------------------------------------------    VITAL SIGNS:  ICU Vital Signs Last 24 Hrs  T(C): 36.9 (27 Aug 2022 00:00), Max: 37.3 (26 Aug 2022 08:00)  T(F): 98.4 (27 Aug 2022 00:00), Max: 99.2 (26 Aug 2022 08:00)  HR: 74 (27 Aug 2022 00:00) (71 - 96)  BP: 143/85 (27 Aug 2022 00:00) (131/87 - 155/79)  BP(mean): 101 (27 Aug 2022 00:00) (98 - 111)  RR: 18 (27 Aug 2022 00:00) (12 - 24)  SpO2: 100% (27 Aug 2022 00:00) (94% - 100%)    O2 Parameters below as of 27 Aug 2022 00:00  Patient On (Oxygen Delivery Method): room air      --------------------------------------------------------------------------------------    INS AND OUTS:  I&O's Detail    25 Aug 2022 07:01  -  26 Aug 2022 07:00  --------------------------------------------------------  IN:    dextrose 5% + sodium chloride 0.45%: 375 mL    Oral Fluid: 600 mL  Total IN: 975 mL    OUT:    Voided (mL): 2800 mL  Total OUT: 2800 mL    Total NET: -1825 mL      26 Aug 2022 07:01  -  27 Aug 2022 00:18  --------------------------------------------------------  IN:    Oral Fluid: 1000 mL  Total IN: 1000 mL    OUT:    Bulb (mL): 20 mL    Bulb (mL): 5 mL    Voided (mL): 2225 mL  Total OUT: 2250 mL    Total NET: -1250 mL        --------------------------------------------------------------------------------------  EXAM  NEUROLOGY:   Exam: Normal, in no acute distress.  Alert and oriented x4.  No focal neurologic deficits.     RESPIRATORY  Exam:  Normal expansion/effort. No respiratory distress.     CARDIOVASCULAR  Exam: S1, S2.  Regular rate and rhythm on monitor.     GI/NUTRITION  Exam: Abdomen soft, Non-tender, Non-distended.    Current Diet: CC Regular     VASCULAR  Exam: Extremities warm.    MUSCULOSKELETAL  Exam: All extremities moving spontaneously.          HEMATOLOGIC  [x] VTE Prophylaxis: enoxaparin Injectable 40 milliGRAM(s) SubCutaneous every 24 hours        TUBES / LINES / DRAINS   [x] Peripheral IV  [] Central Venous Line     	[] R	[] L	[] IJ	[] Fem	[] SC	Date Placed:   [] Arterial Line		[] R	[] L	[] Fem	[] Rad	[] Ax	Date Placed:   [] PICC		[] Midline		[] Mediport  [] Urinary Catheter		Date Placed:   [x] Necessity of urinary, arterial, and venous catheters discussed    --------------------------------------------------------------------------------------    LABS    ((Insert SICU Labs here))***  --------------------------------------------------------------------------------------

## 2022-08-27 NOTE — PROGRESS NOTE ADULT - SUBJECTIVE AND OBJECTIVE BOX
· Subjective and Objective:   Patient seen and examined at bedside.  Denies difficulty breathing. Tolerating diet. L YOLETTE removed yesterday    ICU Vital Signs Last 24 Hrs  T(C): 36.6 (26 Aug 2022 04:00), Max: 37.3 (25 Aug 2022 08:00)  T(F): 97.8 (26 Aug 2022 04:00), Max: 99.1 (25 Aug 2022 08:00)  HR: 84 (26 Aug 2022 04:00) (74 - 84)  BP: 131/87 (26 Aug 2022 04:00) (129/79 - 140/86)  BP(mean): 98 (26 Aug 2022 04:00) (92 - 102)  ABP: --  ABP(mean): --  RR: 24 (26 Aug 2022 04:00) (13 - 26)  SpO2: 94% (26 Aug 2022 04:00) (94% - 100%)    O2 Parameters below as of 25 Aug 2022 10:00  Patient On (Oxygen Delivery Method): room air      General: NAD, AOx3  Breathing comfortably on nasal cannula   AD: Pinna wnl, EAC clear  AS: Pinna wnl, EAC clear  Nose: nasal cavity clear bilaterally, inferior turbinates normal, mucosa normal without crusting or bleeding  OC/OP: tongue normal, floor of mouth wnl, no masses or lesions, OP clear  Neck: neck incision c/d/i, R neck YOLETTE with dark sanguinous output      Assessment and Recommendation:   · Assessment	  Assessment/Plan:  51yo M hx with papillary thyroid cancer as/p total thyroidectomy, central neck dissection on 8/22, c/b b/l TVC paresis, intubated 8/22 and extubated on 8/24.   - nimodipine 30 mg q8 x1 week then 60 mg q8 x 12 weeks for vocal cord paresis  - continue diet  -dispo today  - Records Is and Os  - ENT to follow

## 2022-08-30 ENCOUNTER — TRANSCRIPTION ENCOUNTER (OUTPATIENT)
Age: 52
End: 2022-08-30

## 2022-09-01 ENCOUNTER — APPOINTMENT (OUTPATIENT)
Dept: OTOLARYNGOLOGY | Facility: CLINIC | Age: 52
End: 2022-09-01

## 2022-09-01 PROCEDURE — 99024 POSTOP FOLLOW-UP VISIT: CPT

## 2022-09-01 NOTE — HISTORY OF PRESENT ILLNESS
[None] : No associated symptoms are reported. [de-identified] : Mr. Vigil is accompanied by Dang RN (). He is s/p total thyroidectomy and neck dissection on 8/22/2022 for PTCA. DC from hospital on 8/24/2022. Reports feeling well.  Presents today for incision assessment and suture removal. Denies fever, pain, dysphagia, dysphonia and or dyspnea. Voice better. On regular diet and thin liquids.Denies any numbness or tingling On 8/26/2022 calcium 8.4. pth post op was nl\par On Synthroid 137 mcg daily\par Verbalizes changing endocrinologist to Dr. Nina Burk in [place], appt. in Oct. \par

## 2022-09-01 NOTE — REASON FOR VISIT
[Post-Operative Visit] : a post-operative visit [FreeTextEntry2] : s/p total thyroidectomy and neck dissection on 8/222/2022

## 2022-09-01 NOTE — CONSULT LETTER
[Dear  ___] : Dear  [unfilled], [Courtesy Letter:] : I had the pleasure of seeing your patient, [unfilled], in my office today. [Please see my note below.] : Please see my note below. [Sincerely,] : Sincerely, [FreeTextEntry2] : Alexx Suazo MD ([place], [place]) \par  \par  [FreeTextEntry3] : Anny Velarde NP\par Joe Billy MD, FACS\par \par    [facility] [place]\par Associate Chair\par    Department of Otolaryngology\par \par Professor\par Otolaryngology & Molecular Medicine\par [facility] [employer]\par

## 2022-09-13 LAB — SARS-COV-2 N GENE NPH QL NAA+PROBE: NOT DETECTED

## 2022-09-21 LAB
T3 SERPL-MCNC: 68 NG/DL
T3FREE SERPL-MCNC: 2.03 PG/ML
T4 FREE SERPL-MCNC: 1.2 NG/DL
T4 SERPL-MCNC: 6.3 UG/DL
TSH SERPL-ACNC: 24.3 UIU/ML

## 2022-09-27 ENCOUNTER — APPOINTMENT (OUTPATIENT)
Dept: OTOLARYNGOLOGY | Facility: CLINIC | Age: 52
End: 2022-09-27

## 2022-09-28 ENCOUNTER — APPOINTMENT (OUTPATIENT)
Dept: OTOLARYNGOLOGY | Facility: CLINIC | Age: 52
End: 2022-09-28

## 2022-09-28 VITALS
WEIGHT: 214 LBS | HEART RATE: 98 BPM | BODY MASS INDEX: 28.99 KG/M2 | DIASTOLIC BLOOD PRESSURE: 87 MMHG | SYSTOLIC BLOOD PRESSURE: 138 MMHG | TEMPERATURE: 98.2 F | HEIGHT: 72 IN

## 2022-09-28 DIAGNOSIS — J38.00 PARALYSIS OF VOCAL CORDS AND LARYNX, UNSPECIFIED: ICD-10-CM

## 2022-09-28 PROCEDURE — 99024 POSTOP FOLLOW-UP VISIT: CPT

## 2022-09-28 RX ORDER — LEVOTHYROXINE SODIUM 0.15 MG/1
150 TABLET ORAL DAILY
Qty: 30 | Refills: 1 | Status: DISCONTINUED | COMMUNITY
Start: 2022-09-22 | End: 2022-09-28

## 2022-09-28 NOTE — PROCEDURE
[Hoarseness] : hoarseness not clearly evaluated by indirect laryngoscopy [None] : none [Flexible Endoscope] : examined with the flexible endoscope [Normal] : normal vallecula [Lesion(s)] : lesion(s) [de-identified] : the glottis is patent. The cords adduct but abduction seems ? more limited [de-identified] : see above

## 2022-09-28 NOTE — PHYSICAL EXAM
[de-identified] : healing well [Midline] : trachea located in midline position [Normal] : no rashes

## 2022-09-28 NOTE — HISTORY OF PRESENT ILLNESS
[de-identified] : Mr. Yaritza alvarez is s/p total thyroidectomy and neck dissection on 8/22/2022 for PTCA. Presents today for post op follow up. Reports feeling well.  \par  Denies fever, pain, dysphagia, dysphonia and or dyspnea. \par 9/21/2022 TSH 24.30  Synthroid changed to 150 mcg daily\par Verbalizes changing endocrinologist to Dr. Nina Burk in [place], appt. in Oct. \par \par \par No exercise letha issues

## 2022-10-06 ENCOUNTER — APPOINTMENT (OUTPATIENT)
Dept: OTOLARYNGOLOGY | Facility: CLINIC | Age: 52
End: 2022-10-06
Payer: COMMERCIAL

## 2022-10-06 VITALS
HEART RATE: 85 BPM | HEIGHT: 72 IN | SYSTOLIC BLOOD PRESSURE: 118 MMHG | DIASTOLIC BLOOD PRESSURE: 72 MMHG | BODY MASS INDEX: 28.71 KG/M2 | WEIGHT: 212 LBS

## 2022-10-06 PROCEDURE — 99214 OFFICE O/P EST MOD 30 MIN: CPT | Mod: 25

## 2022-10-06 PROCEDURE — 31579 LARYNGOSCOPY TELESCOPIC: CPT | Mod: 58

## 2022-10-06 PROCEDURE — 69210 REMOVE IMPACTED EAR WAX UNI: CPT | Mod: 79

## 2022-10-06 RX ORDER — OMEPRAZOLE 40 MG/1
40 CAPSULE, DELAYED RELEASE ORAL
Qty: 90 | Refills: 1 | Status: ACTIVE | COMMUNITY
Start: 2022-10-06 | End: 1900-01-01

## 2022-10-06 NOTE — HISTORY OF PRESENT ILLNESS
[de-identified] : 52 year old male referred by Dr. Billy for vocal cord paresis.\par s/p total thyroidectomy and neck dissection on 8/22/2022 complicated by bilateral TVF paresis.\par Reports he was intubated for 2.5 days after surgery. \par Reports his voice "cracks" when he voice gets louder and the longer he talks his voice becomes voice. 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 pt states voice is hoarse.\par Denies complete loss of voice, throat pain, dysphagia, odynophagia, dyspnea, recent fevers or ear/nose/throat infections. Denies use of over the counter antacids or reflux medications.\par Reports he had some nasal congestion and slight post nasal drip--took Mucinex with noted relief\par Denies sinus pain/pressure or nasal discharge. \par DM, on meds; BS high from hospital stay, ahsn't stabilized\par \par

## 2022-10-06 NOTE — CONSULT LETTER
[Dear  ___] : Dear  [unfilled], [Consult Letter:] : I had the pleasure of evaluating your patient, [unfilled]. [Please see my note below.] : Please see my note below. [Consult Closing:] : Thank you very much for allowing me to participate in the care of this patient.  If you have any questions, please do not hesitate to contact me. [Sincerely,] : Sincerely, [FreeTextEntry2] : Dr. Joe Billy  [FreeTextEntry3] : Ezra Jon MD, PhD\par Chief, Division of Laryngology\par Department of Otolaryngology\par [facility]\par Pediatric Otolaryngology, [facility]\par  of Otolaryngology\par [facility]

## 2022-11-30 ENCOUNTER — RX RENEWAL (OUTPATIENT)
Age: 52
End: 2022-11-30

## 2022-12-15 ENCOUNTER — APPOINTMENT (OUTPATIENT)
Dept: ENDOCRINOLOGY | Facility: CLINIC | Age: 52
End: 2022-12-15

## 2023-01-10 ENCOUNTER — APPOINTMENT (OUTPATIENT)
Dept: OTOLARYNGOLOGY | Facility: CLINIC | Age: 53
End: 2023-01-10
Payer: COMMERCIAL

## 2023-01-10 VITALS
HEART RATE: 87 BPM | DIASTOLIC BLOOD PRESSURE: 81 MMHG | OXYGEN SATURATION: 98 % | SYSTOLIC BLOOD PRESSURE: 126 MMHG | HEIGHT: 72 IN | WEIGHT: 215 LBS | BODY MASS INDEX: 29.12 KG/M2

## 2023-01-10 DIAGNOSIS — C73 MALIGNANT NEOPLASM OF THYROID GLAND: ICD-10-CM

## 2023-01-10 PROCEDURE — 99214 OFFICE O/P EST MOD 30 MIN: CPT

## 2023-01-10 RX ORDER — LEVOTHYROXINE SODIUM 0.17 MG/1
175 TABLET ORAL
Refills: 0 | Status: ACTIVE | COMMUNITY

## 2023-01-10 RX ORDER — CHOLECALCIFEROL (VITAMIN D3) 25 MCG
TABLET ORAL
Refills: 0 | Status: ACTIVE | COMMUNITY

## 2023-01-10 RX ORDER — LEVOTHYROXINE SODIUM 150 UG/1
150 TABLET ORAL DAILY
Qty: 30 | Refills: 1 | Status: COMPLETED | COMMUNITY
Start: 2022-09-28 | End: 2023-01-10

## 2023-01-10 RX ORDER — DULAGLUTIDE 4.5 MG/.5ML
INJECTION, SOLUTION SUBCUTANEOUS
Refills: 0 | Status: COMPLETED | COMMUNITY
End: 2023-01-10

## 2023-01-10 RX ORDER — SEMAGLUTIDE 1.34 MG/ML
INJECTION, SOLUTION SUBCUTANEOUS
Refills: 0 | Status: ACTIVE | COMMUNITY

## 2023-01-10 RX ORDER — ORAL SEMAGLUTIDE 14 MG/1
TABLET ORAL
Refills: 0 | Status: COMPLETED | COMMUNITY
End: 2023-01-10

## 2023-01-10 RX ORDER — RAMIPRIL 5 MG/1
CAPSULE ORAL
Refills: 0 | Status: ACTIVE | COMMUNITY

## 2023-01-10 NOTE — CONSULT LETTER
[Dear  ___] : Dear  [unfilled], [Courtesy Letter:] : I had the pleasure of seeing your patient, [unfilled], in my office today. [Please see my note below.] : Please see my note below. [Sincerely,] : Sincerely, [FreeTextEntry2] : Alexx Suazo MD ([place], [place])  [FreeTextEntry3] : Joe Billy MD, FACS\par \par [facility] [place]\par Associate Chair\par Department of Otolaryngology\par Professor\par Otolaryngology & Molecular Medicine\par [facility] [employer]\par

## 2023-01-10 NOTE — REASON FOR VISIT
[FreeTextEntry2] : follow up s/p Right lateral neck dissection, total thyroidectomy, and bilateral central compartment neck dissection 8/22/2022. \par

## 2023-01-10 NOTE — HISTORY OF PRESENT ILLNESS
[de-identified] : 52 year old male follow up s/p Right lateral neck dissection, total thyroidectomy, and bilateral central compartment neck dissection 8/22/2022.  Levothyroxine 175 mcg.  \par Denies dysphagia, odynophagia, dyspnea, dysphonia.\par Endocrinologist to Dr. Nina Burk last seen Dec. 9, 2022.  Under eval re LOREDO w pts endo. \par \par Normo parathyroid postop   voice recoverd and pt back to singing.  no resp issues

## 2024-01-10 ENCOUNTER — APPOINTMENT (OUTPATIENT)
Dept: OTOLARYNGOLOGY | Facility: CLINIC | Age: 54
End: 2024-01-10

## 2024-07-15 NOTE — ASU PATIENT PROFILE, ADULT - CENTRAL VENOUS CATHETER
no Comment: Pre op size at time of biopsy 1 x 0.6 mm \\nPost op size after curettage 1.7x0.9 mm Detail Level: Simple Render Risk Assessment In Note?: no

## 2025-03-20 NOTE — DISCHARGE NOTE PROVIDER - NSDCFUADDINST_GEN_ALL_CORE_FT
Patient was seen in ED on Tues 3/18 for cough and L rib pain, diagnosed with pneumonia and broken rib. States cough is better since starting doxycyline from 3/13 OV but still present. Wondering if provider thinks patient should extent antibiotic treatment due to pneumonia diagnosis? Please advise, thank you   Activity: No heavy lifting or strenuous activity until your follow-up appointment. Walk as tolerated.     Wound Care: You may shower. No baths, hot tubs or swimming until approved by your surgeon.     Follow up after discharge. Call office for appointment.    Call office for fever >101.5 or redness or drainage from wound.     Please call you surgeon if you experience any numbness or tingling around your mouth, in your fingers or toes, or anywhere. This may be a sign of low blood calcium levels. If you have muscle cramping and or curling of your fingers or toes, this could be even more seriously low blood calcium levels. This can be a life-threatening problem and you must seek medical attention immediately. Take 2 tablets of calcium (TUMS antacid) when this happens. You should not drive if you are having these symptoms. Activity: No heavy lifting or strenuous activity until your follow-up appointment. Walk as tolerated.     Wound Care: You may shower. No baths, hot tubs or swimming until approved by your surgeon.     Follow up after discharge. Call office for appointment.    Call office for fever >101.5 or redness or drainage from wound.     Please call you surgeon if you experience any numbness or tingling around your mouth, in your fingers or toes, or anywhere. This may be a sign of low blood calcium levels. If you have muscle cramping and or curling of your fingers or toes, this could be even more seriously low blood calcium levels. This can be a life-threatening problem and you must seek medical attention immediately. Take 2 tablets of calcium (TUMS antacid) when this happens. You should not drive if you are having these symptoms.    Please take 10mL of the nimodipine solution every 8 hours until the end of the day 8/29/22. Then starting 8/30/22, please take 20mL of the solution until your follow up with Dr. Billy.    Take your synthroid as instructed on the bottle, every morning 1hr before eating.  Activity: No heavy lifting or strenuous activity until your follow-up appointment. Walk as tolerated.     Wound Care: You may shower. No baths, hot tubs or swimming until approved by your surgeon.     Follow up after discharge. Call office for appointment.    Call office for fever >101.5 or redness or drainage from wound.     Please call you surgeon if you experience any numbness or tingling around your mouth, in your fingers or toes, or anywhere. This may be a sign of low blood calcium levels. If you have muscle cramping and or curling of your fingers or toes, this could be even more seriously low blood calcium levels. This can be a life-threatening problem and you must seek medical attention immediately. Take 2 tablets of calcium (TUMS antacid) when this happens. You should not drive if you are having these symptoms.    Please take 10mL of the nimodipine solution every 8 hours until the end of the day 8/29/22. Then starting 8/30/22, please take 20mL of the solution every 8 hours until your follow up with Dr. Billy.    Take your synthroid as instructed on the bottle, every morning 1hr before eating.

## (undated) DEVICE — DRAPE TOWEL BLUE 17" X 24"

## (undated) DEVICE — LONE START RETRACTOR RING 12MM BLUNT DISP

## (undated) DEVICE — SPONGE RAYTEC 4X4 16PLY

## (undated) DEVICE — NERVE STIMULATOR/LOCATOR HEAD AND NECK MODEL

## (undated) DEVICE — SOL IRR POUR H2O 500ML

## (undated) DEVICE — SPONGE PEANUT AUTO COUNT

## (undated) DEVICE — ELCTR GROUNDING PAD ADULT COVIDIEN

## (undated) DEVICE — DRSG 4X4

## (undated) DEVICE — PREP BETADINE SPONGE STICKS

## (undated) DEVICE — FRAZIER SUCTION TIP 8FR

## (undated) DEVICE — DRAIN JACKSON PRATT 7FR ROUND END NO TROCAR

## (undated) DEVICE — GLV 7 PROTEXIS

## (undated) DEVICE — DRSG TEGADERM 4X4.75"

## (undated) DEVICE — DRAPE MAGNETIC INSTRUMENT MEDIUM

## (undated) DEVICE — DRAPE TOP SHEET 53"X101"

## (undated) DEVICE — DRSG STERISTRIPS 0.25X3"

## (undated) DEVICE — DRSG 3M BENZOIN 0.6CC

## (undated) DEVICE — SAFETY PIN

## (undated) DEVICE — SUT SILK 3-0 18" TIES

## (undated) DEVICE — SYR LUER LOK 5CC

## (undated) DEVICE — LABEL MED BLANK W PEN

## (undated) DEVICE — FORCEP BIPOLAR NON STICK 4" W 12FT CORD DISP

## (undated) DEVICE — SUT VICRYL 4-0 27" RB-1 UNDYED

## (undated) DEVICE — DRAIN RESERVOIR FOR JACKSON PRATT 100CC CARDINAL

## (undated) DEVICE — DRAPE FLUID WARMER 44X44"

## (undated) DEVICE — WRAP COMPRESSION CALF MED

## (undated) DEVICE — STAPLER SKIN VISI-STAT 35 WIDE

## (undated) DEVICE — NDL HYPO SAFE 22G X 1"

## (undated) DEVICE — SUT MONOCRYL 4-0 18" P-3 UNDYED

## (undated) DEVICE — DRAPE INSTRUMENT POUCH

## (undated) DEVICE — PACK HEAD & NECK

## (undated) DEVICE — BLANKET WARMER LOWER ADULT

## (undated) DEVICE — SUT MONOSOF 6-0 18" P-10

## (undated) DEVICE — SUT SILK 2-0 18" TIES

## (undated) DEVICE — SUT SILK 2-0 18" FS

## (undated) DEVICE — DRAPE SPLIT SHEETS 77X120"

## (undated) DEVICE — DRAPE 3/4 SHEET 52X76"

## (undated) DEVICE — ELCTR PENCIL NEPTUNE SMOKE EVACUATION

## (undated) DEVICE — PROBE STIMULATOR MONOPOLAR FLUSH TIP